# Patient Record
Sex: FEMALE | Race: WHITE | NOT HISPANIC OR LATINO | Employment: STUDENT | URBAN - NONMETROPOLITAN AREA
[De-identification: names, ages, dates, MRNs, and addresses within clinical notes are randomized per-mention and may not be internally consistent; named-entity substitution may affect disease eponyms.]

---

## 2018-02-22 ENCOUNTER — OFFICE VISIT (OUTPATIENT)
Dept: INTERNAL MEDICINE | Facility: CLINIC | Age: 23
End: 2018-02-22

## 2018-02-22 VITALS
BODY MASS INDEX: 24.16 KG/M2 | TEMPERATURE: 98.4 F | OXYGEN SATURATION: 96 % | HEIGHT: 64 IN | DIASTOLIC BLOOD PRESSURE: 64 MMHG | WEIGHT: 141.5 LBS | SYSTOLIC BLOOD PRESSURE: 98 MMHG | RESPIRATION RATE: 14 BRPM | HEART RATE: 58 BPM

## 2018-02-22 DIAGNOSIS — IMO0002: ICD-10-CM

## 2018-02-22 DIAGNOSIS — F41.9 ANXIETY AND DEPRESSION: ICD-10-CM

## 2018-02-22 DIAGNOSIS — F32.A ANXIETY AND DEPRESSION: ICD-10-CM

## 2018-02-22 DIAGNOSIS — Z76.89 ENCOUNTER TO ESTABLISH CARE: Primary | ICD-10-CM

## 2018-02-22 DIAGNOSIS — IMO0002 SELF-INFLICTED INJURY: ICD-10-CM

## 2018-02-22 PROCEDURE — 99204 OFFICE O/P NEW MOD 45 MIN: CPT | Performed by: NURSE PRACTITIONER

## 2018-02-22 RX ORDER — ESCITALOPRAM OXALATE 10 MG/1
10 TABLET ORAL DAILY
Qty: 30 TABLET | Refills: 1 | Status: SHIPPED | OUTPATIENT
Start: 2018-02-22 | End: 2018-04-30

## 2018-02-22 RX ORDER — BUSPIRONE HYDROCHLORIDE 5 MG/1
5 TABLET ORAL 3 TIMES DAILY PRN
Qty: 60 TABLET | Refills: 1 | Status: SHIPPED | OUTPATIENT
Start: 2018-02-22 | End: 2018-04-30 | Stop reason: SDUPTHER
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
## 2018-04-30 ENCOUNTER — OFFICE VISIT (OUTPATIENT)
Dept: INTERNAL MEDICINE | Facility: CLINIC | Age: 23
End: 2018-04-30

## 2018-04-30 VITALS
RESPIRATION RATE: 14 BRPM | OXYGEN SATURATION: 98 % | TEMPERATURE: 98.6 F | HEIGHT: 64 IN | SYSTOLIC BLOOD PRESSURE: 104 MMHG | HEART RATE: 60 BPM | WEIGHT: 141.13 LBS | DIASTOLIC BLOOD PRESSURE: 64 MMHG | BODY MASS INDEX: 24.1 KG/M2

## 2018-04-30 DIAGNOSIS — F32.A ANXIETY AND DEPRESSION: ICD-10-CM

## 2018-04-30 DIAGNOSIS — F41.9 ANXIETY AND DEPRESSION: ICD-10-CM

## 2018-04-30 PROCEDURE — 99213 OFFICE O/P EST LOW 20 MIN: CPT | Performed by: NURSE PRACTITIONER

## 2018-04-30 RX ORDER — ESCITALOPRAM OXALATE 20 MG/1
20 TABLET ORAL DAILY
Qty: 30 TABLET | Refills: 1 | Status: SHIPPED | OUTPATIENT
Start: 2018-04-30 | End: 2018-05-30

## 2018-04-30 RX ORDER — ESCITALOPRAM OXALATE 10 MG/1
TABLET ORAL
Qty: 30 TABLET | Refills: 1 | OUTPATIENT
Start: 2018-04-30

## 2018-04-30 RX ORDER — BUSPIRONE HYDROCHLORIDE 5 MG/1
5 TABLET ORAL 3 TIMES DAILY PRN
Qty: 60 TABLET | Refills: 1 | Status: SHIPPED | OUTPATIENT
Start: 2018-04-30 | End: 2018-05-30 | Stop reason: SDUPTHER

## 2018-05-30 ENCOUNTER — OFFICE VISIT (OUTPATIENT)
Dept: INTERNAL MEDICINE | Facility: CLINIC | Age: 23
End: 2018-05-30

## 2018-05-30 VITALS
OXYGEN SATURATION: 98 % | HEIGHT: 64 IN | HEART RATE: 60 BPM | SYSTOLIC BLOOD PRESSURE: 98 MMHG | RESPIRATION RATE: 12 BRPM | WEIGHT: 147.75 LBS | TEMPERATURE: 98.4 F | BODY MASS INDEX: 25.22 KG/M2 | DIASTOLIC BLOOD PRESSURE: 60 MMHG

## 2018-05-30 DIAGNOSIS — F41.9 ANXIETY AND DEPRESSION: Primary | ICD-10-CM

## 2018-05-30 DIAGNOSIS — J30.2 ACUTE SEASONAL ALLERGIC RHINITIS, UNSPECIFIED TRIGGER: ICD-10-CM

## 2018-05-30 DIAGNOSIS — F32.A ANXIETY AND DEPRESSION: Primary | ICD-10-CM

## 2018-05-30 DIAGNOSIS — B35.1 ONYCHOMYCOSIS OF RIGHT GREAT TOE: ICD-10-CM

## 2018-05-30 DIAGNOSIS — Z30.09 BIRTH CONTROL COUNSELING: ICD-10-CM

## 2018-05-30 PROCEDURE — 99214 OFFICE O/P EST MOD 30 MIN: CPT | Performed by: NURSE PRACTITIONER

## 2018-05-30 RX ORDER — BUSPIRONE HYDROCHLORIDE 5 MG/1
5 TABLET ORAL 3 TIMES DAILY PRN
Qty: 60 TABLET | Refills: 1 | Status: SHIPPED | OUTPATIENT
Start: 2018-05-30 | End: 2019-09-04 | Stop reason: SDDI

## 2018-05-30 RX ORDER — FLUOXETINE 10 MG/1
10 CAPSULE ORAL DAILY
Qty: 30 CAPSULE | Refills: 1 | Status: SHIPPED | OUTPATIENT
Start: 2018-05-30 | End: 2018-06-19 | Stop reason: SINTOL

## 2018-05-30 RX ORDER — FLUTICASONE PROPIONATE 50 MCG
2 SPRAY, SUSPENSION (ML) NASAL DAILY
Qty: 15.8 ML | Refills: 1 | Status: SHIPPED | OUTPATIENT
Start: 2018-05-30 | End: 2018-06-29

## 2018-05-30 NOTE — PROGRESS NOTES
"  Chief Complaint / Reason:      Chief Complaint   Patient presents with   • Anxiety     f/u-1 mo., Lexapro not helpful.    • Depression       Subjective     HPI  Patient presents today for one-month follow-up regarding anxiety and depression.  She indicates that the Lexapro was not very helpful and would like to change it to a different medication.  Denies SI or HI.  She is accompanied by her mother who states that she has tried multiple medications which include Zoloft, Cymbalta, Celexa, Wellbutrin and each of those have not been very effective in controlling her symptoms.  Patient does state that the BuSpar is working but she is much quieter at this visit than her previous.  She does have a list of things she would like to discussed which include nasal spray for allergies and right great toe fungus possibly.  Patient's mother states that her  has a toe fungus in his toenails are very thick and it appears that patient is having the start of some thickening nail beds and she noticed when patient was painting her toenails.  Indicates a small yellow spot but is unable to visualize due to nail polish covering right great toe.  Also would like to discuss birth control.  Patient has tried Nexplanon, NuvaRing and multiple oral agents.  Patient states that she wants something that is not going to make her gain weight and if it does it should be able to come out quickly because otherwise she will get \"mad and flipped over a table\".  Recommend patient following up with Dr. Moe to discuss options available.  Patient was given a handout which discussed IUD devices for her to review.    History taken from: patient    PMH/FH/Social History were reviewed and updated appropriately in the electronic medical record.     Review of Systems:   Review of Systems   Constitutional: Negative.    HENT: Positive for postnasal drip.    Respiratory: Negative.    Cardiovascular: Negative.    Skin: Positive for color change. "   Psychiatric/Behavioral: Positive for dysphoric mood. The patient is nervous/anxious.      All other systems were reviewed and are negative.  Exceptions are noted in the subjective or above.      Objective     Vital Signs  Vitals:    05/30/18 1717   BP: 98/60   Pulse: 60   Resp: 12   Temp: 98.4 °F (36.9 °C)   SpO2: 98%       Body mass index is 25.36 kg/m².    Physical Exam   Constitutional: She is oriented to person, place, and time. She appears well-developed and well-nourished. No distress.   Cardiovascular: Normal rate, regular rhythm and normal heart sounds.    Pulmonary/Chest: Effort normal and breath sounds normal. She has no wheezes. She exhibits no tenderness.   Neurological: She is alert and oriented to person, place, and time.   Skin: Skin is warm and dry. No rash noted. No erythema. No pallor.   Yellowish coloring of nail of right great toe   Psychiatric: She has a normal mood and affect. Her behavior is normal. Judgment and thought content normal.   Nursing note and vitals reviewed.       Results Review:    I reviewed the patient's previous clinical results.       Medication Review:     Current Outpatient Prescriptions:   •  busPIRone (BUSPAR) 5 MG tablet, Take 1 tablet by mouth 3 (Three) Times a Day As Needed (anxiety)., Disp: 60 tablet, Rfl: 1  •  FLUoxetine (PROZAC) 10 MG capsule, Take 1 capsule by mouth Daily., Disp: 30 capsule, Rfl: 1  •  fluticasone (FLONASE) 50 MCG/ACT nasal spray, 2 sprays into each nostril Daily for 30 days. Administer 2 sprays in each nostril for each dose., Disp: 15.8 mL, Rfl: 1    Assessment/Plan   Shikha was seen today for anxiety and depression.    Diagnoses and all orders for this visit:    Anxiety and depression  -     busPIRone (BUSPAR) 5 MG tablet; Take 1 tablet by mouth 3 (Three) Times a Day As Needed (anxiety).  -     FLUoxetine (PROZAC) 10 MG capsule; Take 1 capsule by mouth Daily.  Recommend genesight testing if medication not effective  Onychomycosis of right  great toe  Treatment symptomatically and avoid sharing nail clippers at home  Acute seasonal allergic rhinitis, unspecified trigger  -     fluticasone (FLONASE) 50 MCG/ACT nasal spray; 2 sprays into each nostril Daily for 30 days. Administer 2 sprays in each nostril for each dose.    Birth control counseling  Recommended patient follow-up with Dr. Moe regarding IUD options and advised patient to prevent pregnancy in the meantime      Return if symptoms worsen or fail to improve.    Kinza Tom, APRN  05/30/2018

## 2018-06-19 ENCOUNTER — OFFICE VISIT (OUTPATIENT)
Dept: INTERNAL MEDICINE | Facility: CLINIC | Age: 23
End: 2018-06-19

## 2018-06-19 VITALS
HEART RATE: 66 BPM | DIASTOLIC BLOOD PRESSURE: 62 MMHG | RESPIRATION RATE: 12 BRPM | TEMPERATURE: 98.1 F | HEIGHT: 64 IN | WEIGHT: 148 LBS | BODY MASS INDEX: 25.27 KG/M2 | SYSTOLIC BLOOD PRESSURE: 100 MMHG | OXYGEN SATURATION: 99 %

## 2018-06-19 DIAGNOSIS — F41.9 ANXIETY: ICD-10-CM

## 2018-06-19 DIAGNOSIS — F33.2 SEVERE EPISODE OF RECURRENT MAJOR DEPRESSIVE DISORDER, WITHOUT PSYCHOTIC FEATURES (HCC): Primary | ICD-10-CM

## 2018-06-19 DIAGNOSIS — Z30.09 BIRTH CONTROL COUNSELING: ICD-10-CM

## 2018-06-19 DIAGNOSIS — G43.809 OTHER MIGRAINE WITHOUT STATUS MIGRAINOSUS, NOT INTRACTABLE: ICD-10-CM

## 2018-06-19 PROCEDURE — 99214 OFFICE O/P EST MOD 30 MIN: CPT | Performed by: FAMILY MEDICINE

## 2018-06-19 RX ORDER — BUPROPION HYDROCHLORIDE 150 MG/1
150 TABLET ORAL EVERY MORNING
Qty: 30 TABLET | Refills: 5 | Status: SHIPPED | OUTPATIENT
Start: 2018-06-19 | End: 2019-01-30

## 2018-06-19 RX ORDER — RIZATRIPTAN BENZOATE 5 MG/1
5 TABLET, ORALLY DISINTEGRATING ORAL ONCE AS NEEDED
Qty: 9 TABLET | Refills: 2 | Status: SHIPPED | OUTPATIENT
Start: 2018-06-19 | End: 2018-08-14

## 2018-06-19 NOTE — PROGRESS NOTES
Subjective    Shikha Nunez is a 22 y.o. female here for:  Chief Complaint   Patient presents with   • Establish Care     discuss birth control   • Depression     Desires contraception. Did not like pills, weight gain. Weight gain with Nexplanon and depo provera. Interested in IUD.    Taking Buspar for anxiety. Helps some but is a bit sedating.      Depression   Visit Type: initial  Onset of symptoms: more than 1 year ago  Progression since onset: rapidly worsening  Patient presents with the following symptoms: depressed mood, excessive worry, fatigue, feelings of hopelessness, panic, suicidal ideas and thoughts of death.  Patient is not experiencing: suicidal planning.  Frequency of symptoms: constantly   Severity: interfering with daily activities   Aggravated by: family issues and social activities  Risk factors: family history, major life event and previous episode of depression  Patient has a history of: depression  No history of: arrhythmia, bipolar disorder, CAD, CHF, chronic lung disease, hyperthyroidism and suicide attempt  Treatment tried: SSRI, non-benzodiazephine anxiolytics, non-SSRI antidepressants, lifestyle changes and medications  Compliance with treatment: variable  Improvement on treatment: no relief  Past compliance problems: medication issues      Migraine    This is a chronic problem. The current episode started more than 1 year ago. The problem occurs intermittently. The problem has been unchanged. Associated symptoms include photophobia. She has tried acetaminophen and NSAIDs for the symptoms. The treatment provided mild relief. Her past medical history is significant for migraines in the family. There is no history of cancer, hypertension or immunosuppression.          The following portions of the patient's history were reviewed and updated as appropriate: allergies, current medications, past family history, past medical history, past social history, past surgical history and problem  "list.    Review of Systems   Constitutional: Positive for fatigue.   Eyes: Positive for photophobia.   Genitourinary: Negative for menstrual problem.   Neurological: Positive for headache.   Psychiatric/Behavioral: Positive for suicidal ideas and depressed mood.       Vitals:    06/19/18 1430   BP: 100/62   Pulse: 66   Resp: 12   Temp: 98.1 °F (36.7 °C)   SpO2: 99%   Weight: 67.1 kg (148 lb)   Height: 162.6 cm (64\")         Objective   Physical Exam   Constitutional: She is oriented to person, place, and time. Vital signs are normal. She appears well-developed and well-nourished. She is active. She does not appear ill. No distress.   HENT:   Head: Normocephalic and atraumatic.   Right Ear: Hearing normal.   Left Ear: Hearing normal.   Mouth/Throat: Mucous membranes are not dry.   Eyes: No scleral icterus. Right eye exhibits normal extraocular motion and no nystagmus. Left eye exhibits abnormal extraocular motion. Right pupil is round and reactive.   Artificial left eye   Neck: Neck supple.   Pulmonary/Chest: Effort normal.   Neurological: She is alert and oriented to person, place, and time. She displays no tremor. No cranial nerve deficit. Gait normal.   Skin: Skin is warm. Turgor is normal. No rash noted. She is not diaphoretic.   Psychiatric: Her behavior is normal. Judgment and thought content normal. Her speech is delayed. Cognition and memory are normal. She exhibits a depressed mood. She is attentive.   Nursing note and vitals reviewed.        Assessment/Plan     Problem List Items Addressed This Visit        Other    Severe episode of recurrent major depressive disorder, without psychotic features - Primary    Overview     · Failure of Zoloft, Lexapro, Cymbalta, Prozac          Relevant Medications    buPROPion XL (WELLBUTRIN XL) 150 MG 24 hr tablet    Anxiety      Other Visit Diagnoses     Other migraine without status migrainosus, not intractable        Relevant Medications    buPROPion XL (WELLBUTRIN XL) " 150 MG 24 hr tablet    rizatriptan MLT (MAXALT-MLT) 5 MG disintegrating tablet    Birth control counseling              · Forms signed for IUD. Discussed Jean-Paulena  · Will see back in two weeks. Encouraged patient to stop SSRI if develops any suicidal, homicidal thoughts.  · May continue Buspar as needed.  · Consider counselling    Return in about 2 weeks (around 7/3/2018).    Pia Moe MD    Please note that portions of this note may have been completed with a voice recognition program. Efforts were made to edit dictation, but occasionally words are mistranscribed.

## 2018-07-19 ENCOUNTER — TELEPHONE (OUTPATIENT)
Dept: INTERNAL MEDICINE | Facility: CLINIC | Age: 23
End: 2018-07-19

## 2018-07-19 NOTE — TELEPHONE ENCOUNTER
PATIENT'S MOTHER ADVISES NO PA IS NEEDED FOR BIRTH CONTROL.  INSURANCE COMPANY STATES THEY HAVE NOT RECEIVED ANY REQUEST BUT SHE DOESN'T NEED PRIOR PA.    MOTHER IS VERY UPSET ABOUT THIS AND ASKS THAT YOU PLEASE RETURN CALL ASAP.

## 2018-07-19 NOTE — TELEPHONE ENCOUNTER
I spoke with Vicky at [facility] regarding the patients IUD. She informed me that they needed information from the patients Prescription Card that Included that RXBIN, RXPCN, and RXGRP numbers. We did not have a copy of the patients card on file, so I was unable to give vicky that Information. She also informed me that they have been trying to contact the patient to get this information as well, but have not been successful.      I attempted to contact the patients mother to explain this to her but there was no answer and I could not leave a message.    So, I called the patient, there was no answer, so I did leave a message for the patient explaining what was going on, and requested that she please contact the company so they could get this set to our office asap.

## 2018-08-14 ENCOUNTER — PROCEDURE VISIT (OUTPATIENT)
Dept: INTERNAL MEDICINE | Facility: CLINIC | Age: 23
End: 2018-08-14

## 2018-08-14 VITALS
BODY MASS INDEX: 25.82 KG/M2 | DIASTOLIC BLOOD PRESSURE: 80 MMHG | WEIGHT: 151.25 LBS | HEART RATE: 61 BPM | HEIGHT: 64 IN | OXYGEN SATURATION: 95 % | TEMPERATURE: 98.6 F | SYSTOLIC BLOOD PRESSURE: 115 MMHG

## 2018-08-14 DIAGNOSIS — Z12.4 PAP SMEAR FOR CERVICAL CANCER SCREENING: ICD-10-CM

## 2018-08-14 DIAGNOSIS — Z30.430 ENCOUNTER FOR IUD INSERTION: ICD-10-CM

## 2018-08-14 DIAGNOSIS — G43.809 OTHER MIGRAINE WITHOUT STATUS MIGRAINOSUS, NOT INTRACTABLE: Primary | ICD-10-CM

## 2018-08-14 DIAGNOSIS — Z11.3 SCREEN FOR STD (SEXUALLY TRANSMITTED DISEASE): ICD-10-CM

## 2018-08-14 LAB
B-HCG UR QL: NEGATIVE
INTERNAL NEGATIVE CONTROL: NEGATIVE
INTERNAL POSITIVE CONTROL: POSITIVE
Lab: NORMAL

## 2018-08-14 PROCEDURE — 81025 URINE PREGNANCY TEST: CPT | Performed by: FAMILY MEDICINE

## 2018-08-14 PROCEDURE — 99213 OFFICE O/P EST LOW 20 MIN: CPT | Performed by: FAMILY MEDICINE

## 2018-08-14 RX ORDER — NARATRIPTAN 2.5 MG/1
2.5 TABLET ORAL ONCE AS NEEDED
Qty: 9 TABLET | Refills: 3 | Status: SHIPPED | OUTPATIENT
Start: 2018-08-14 | End: 2018-11-01

## 2018-08-14 RX ORDER — LEVONORGESTREL 19.5 MG/1
INTRAUTERINE DEVICE INTRAUTERINE
Refills: 0 | COMMUNITY
Start: 2018-08-08 | End: 2018-11-01

## 2018-08-14 NOTE — PROGRESS NOTES
Procedure   Insert Drug Implant  Date/Time: 2018 3:40 PM  Performed by: LUCIO PEREZ  Authorized by: LUCIO PEREZ   Consent: Verbal consent obtained. Written consent obtained.  Risks and benefits: risks, benefits and alternatives were discussed  Consent given by: patient  Patient understanding: patient states understanding of the procedure being performed  Patient consent: the patient's understanding of the procedure matches consent given  Procedure consent: procedure consent matches procedure scheduled  Relevant documents: relevant documents present and verified  Test results: test results available and properly labeled  Imaging studies: imaging studies not available  Required items: required blood products, implants, devices, and special equipment available  Patient identity confirmed: verbally with patient  Local anesthesia used: no    Anesthesia:  Local anesthesia used: no    Sedation:  Patient sedated: no  Comments: Cervix cleaned x 3 with iodine. With sterile gloves sound placed in external cervical os, would not pass past 4 cm. Tenaculum placed and this did not help with sounding uterus, sound would still not pass. An os finder, disposable, was used and this also failed to pass through the internal os. Procedure aborted. Kyleena still in box, still wrapped in cellophane. Mom will call if referral needed for gyn. Estimated blood loss minimal. Pap obtained and gonorrhea/chlamydia testing prior to IUD insertion procedure.           Chief Complaint   Patient presents with   • Contraception     Kyleena Insertion   • Migraine     Would like to discuss the Maxalt     Patient here for IUD insertion. [id number]. Kyleena approved. On period, end of cycle, tampon currently in place. Has not had pap smear yet, 21 yo, sexually active.     Also struggling with her migraines, Maxalt and Imitrex have failed to help with  of migraines.     ROS: positive for anxiety, stress, migraines.    Vitals:     08/14/18 1554   BP: 115/80   Pulse: 61   Temp: 98.6 °F (37 °C)   SpO2: 95%   Physical Exam   Constitutional: Vital signs are normal. She appears well-developed and well-nourished. She is active. She does not appear ill. No distress.   Pulmonary/Chest: Effort normal.   Genitourinary: Vagina normal and uterus normal. Pelvic exam was performed with patient prone. No labial fusion. There is no rash, tenderness, lesion or injury on the right labia. There is no rash, tenderness, lesion or injury on the left labia. Cervix exhibits no motion tenderness, no discharge and no friability.   Genitourinary Comments: Melody Cannon MA, Chaperone   Neurological: She is alert.   Skin: Skin is warm.        Psychiatric: Her mood appears anxious.   Nursing note and vitals reviewed.     Procedure visit on 08/14/2018   Component Date Value Ref Range Status   • HCG, Urine, QL 08/14/2018 Negative  Negative Final   • Lot Number 08/14/2018 [id number]   Final   • Internal Positive Control 08/14/2018 Positive   Final   • Internal Negative Control 08/14/2018 Negative   Final     Shikha was seen today for contraception and migraine.    Diagnoses and all orders for this visit:    Other migraine without status migrainosus, not intractable  -     naratriptan (AMERGE) 2.5 MG tablet; Take 1 tablet by mouth 1 (One) Time As Needed for Migraine for up to 1 dose. 2.5 mg at onset of headache, may repeat in 4 hours if needed    Encounter for IUD insertion  -     POC Pregnancy, Urine  -     Insert Drug Implant    Pap smear for cervical cancer screening    Screen for STD (sexually transmitted disease)  -     [facility] - Swab, Vagina    · pap completed, send out.  · Kyleena device and form for [facility] provided to Mom (Missy). Needs gynecology visit for insertion as I'm unable to place device today.  · If referral needed Mom or patient to let me know

## 2018-08-17 LAB
A VAGINAE DNA VAG QL NAA+PROBE: NORMAL SCORE
BVAB2 DNA VAG QL NAA+PROBE: NORMAL SCORE
C ALBICANS DNA VAG QL NAA+PROBE: NEGATIVE
C GLABRATA DNA VAG QL NAA+PROBE: NEGATIVE
C TRACH RRNA SPEC QL NAA+PROBE: NEGATIVE
MEGA1 DNA VAG QL NAA+PROBE: NORMAL SCORE
N GONORRHOEA RRNA SPEC QL NAA+PROBE: NEGATIVE
T VAGINALIS RRNA SPEC QL NAA+PROBE: NEGATIVE

## 2018-11-01 ENCOUNTER — OFFICE VISIT (OUTPATIENT)
Dept: INTERNAL MEDICINE | Facility: CLINIC | Age: 23
End: 2018-11-01

## 2018-11-01 VITALS
BODY MASS INDEX: 27.7 KG/M2 | HEART RATE: 73 BPM | DIASTOLIC BLOOD PRESSURE: 70 MMHG | TEMPERATURE: 98.4 F | SYSTOLIC BLOOD PRESSURE: 115 MMHG | HEIGHT: 64 IN | WEIGHT: 162.25 LBS | OXYGEN SATURATION: 98 %

## 2018-11-01 DIAGNOSIS — G43.809 OTHER MIGRAINE WITHOUT STATUS MIGRAINOSUS, NOT INTRACTABLE: Primary | ICD-10-CM

## 2018-11-01 DIAGNOSIS — Z30.432 ENCOUNTER FOR IUD REMOVAL: ICD-10-CM

## 2018-11-01 PROCEDURE — 99213 OFFICE O/P EST LOW 20 MIN: CPT | Performed by: FAMILY MEDICINE

## 2018-11-01 PROCEDURE — 58301 REMOVE INTRAUTERINE DEVICE: CPT | Performed by: FAMILY MEDICINE

## 2018-11-01 RX ORDER — ETONOGESTREL AND ETHINYL ESTRADIOL 11.7; 2.7 MG/1; MG/1
INSERT, EXTENDED RELEASE VAGINAL
Qty: 3 EACH | Refills: 4 | Status: SHIPPED | OUTPATIENT
Start: 2018-11-01 | End: 2019-02-04

## 2018-11-01 RX ORDER — ALMOTRIPTAN 6.25 MG/1
TABLET, FILM COATED ORAL
Qty: 30 TABLET | Refills: 3 | Status: SHIPPED | OUTPATIENT
Start: 2018-11-01 | End: 2019-09-04

## 2018-11-01 NOTE — PROGRESS NOTES
"Subjective    Shikha Nunez is a 22 y.o. female here for:  Chief Complaint   Patient presents with   • Procedure     IUD Removal     History of Present Illness     Patient has Kyleena IUD and desires removal. Requests Nuvaring which she's tried in the past. Has failed Nexplanon and OCPs. Patient has migraines.    Also needs a new migraine medicine. Current triptan is not reliving headaches. Has tried Maxalt and Imitrex. Amerge listed as current triptan.     The following portions of the patient's history were reviewed and updated as appropriate: allergies, current medications, past family history, past medical history, past social history, past surgical history and problem list.    Review of Systems   Genitourinary: Positive for pelvic pain.   Neurological: Positive for headache.   Psychiatric/Behavioral: Positive for dysphoric mood.       Vitals:    11/01/18 1132   BP: 115/70   Pulse: 73   Temp: 98.4 °F (36.9 °C)   SpO2: 98%   Weight: 73.6 kg (162 lb 4 oz)   Height: 162.6 cm (64.02\")         Objective   Physical Exam   Constitutional: She is oriented to person, place, and time. Vital signs are normal. She appears well-developed and well-nourished. She is active.  Non-toxic appearance. She does not appear ill. No distress. She appears overweight.   HENT:   Head: Normocephalic and atraumatic.   Right Ear: Hearing normal.   Left Ear: Hearing normal.   Mouth/Throat: Mucous membranes are not dry.   Eyes: No scleral icterus.   Neck: Neck supple.   Pulmonary/Chest: Effort normal.   Genitourinary: Vagina normal and cervix normal. Pelvic exam was performed with patient supine. No labial fusion. There is no rash, tenderness, lesion, injury or Bartholin's cyst on the right labia. There is no rash, tenderness, lesion, injury or Bartholin's cyst on the left labia.   Neurological: She is alert and oriented to person, place, and time. No cranial nerve deficit.   Skin: Skin is warm. She is not diaphoretic.   Psychiatric: She has a " normal mood and affect. Her behavior is normal.   Nursing note and vitals reviewed.      Remove & Insert Drug Implant  Date/Time: 11/1/2018 11:45 AM  Performed by: LUCIO MOE  Authorized by: LUCIO MOE   Consent: Verbal consent obtained. Written consent obtained.  Risks and benefits: risks, benefits and alternatives were discussed  Consent given by: patient  Patient understanding: patient states understanding of the procedure being performed  Patient consent: the patient's understanding of the procedure matches consent given  Procedure consent: procedure consent matches procedure scheduled  Relevant documents: relevant documents present and verified  Required items: required blood products, implants, devices, and special equipment available  Patient identity confirmed: verbally with patient  Local anesthesia used: no    Anesthesia:  Local anesthesia used: no    Sedation:  Patient sedated: no  Patient tolerance: Patient tolerated the procedure well with no immediate complications  Comments: Ring forceps implemented to pull IUD from uterus with gentle traction. IUD removed easily. No blood loss appreciated.            Assessment/Plan     Problem List Items Addressed This Visit     None      Visit Diagnoses     Other migraine without status migrainosus, not intractable    -  Primary    Relevant Medications    almotriptan (AXERT) 6.25 MG tablet    Encounter for IUD removal        Relevant Orders    Remove & Insert Drug Implant            Lucio Moe MD

## 2018-11-14 ENCOUNTER — PATIENT MESSAGE (OUTPATIENT)
Dept: INTERNAL MEDICINE | Facility: CLINIC | Age: 23
End: 2018-11-14

## 2018-11-14 NOTE — TELEPHONE ENCOUNTER
From: Shikha Nunez  To: Pia Moe MD  Sent: 11/14/2018 10:49 AM EST  Subject: Prescription Question    Could you please send Allegra 180mg allergy med.? I’ve had sinus headache and congestion for over a week and don’t think previous allergy medicine is helping anymore. If I need to schedule an appt. please let me know.   Thank you,   [facility]

## 2018-11-18 RX ORDER — FEXOFENADINE HCL 180 MG/1
180 TABLET ORAL DAILY
Qty: 90 TABLET | Refills: 3 | Status: SHIPPED | OUTPATIENT
Start: 2018-11-18 | End: 2022-12-13

## 2018-11-18 RX ORDER — FEXOFENADINE HCL 180 MG/1
180 TABLET ORAL DAILY
Qty: 90 TABLET | Refills: 3 | Status: SHIPPED | OUTPATIENT
Start: 2018-11-18 | End: 2018-11-18 | Stop reason: SDUPTHER

## 2019-01-28 ENCOUNTER — PATIENT MESSAGE (OUTPATIENT)
Dept: INTERNAL MEDICINE | Facility: CLINIC | Age: 24
End: 2019-01-28

## 2019-01-28 DIAGNOSIS — F33.2 SEVERE EPISODE OF RECURRENT MAJOR DEPRESSIVE DISORDER, WITHOUT PSYCHOTIC FEATURES (HCC): ICD-10-CM

## 2019-01-30 RX ORDER — BUPROPION HYDROCHLORIDE 300 MG/1
300 TABLET ORAL EVERY MORNING
Qty: 90 TABLET | Refills: 4 | Status: SHIPPED | OUTPATIENT
Start: 2019-01-30 | End: 2019-09-04

## 2019-02-04 ENCOUNTER — OFFICE VISIT (OUTPATIENT)
Dept: INTERNAL MEDICINE | Facility: CLINIC | Age: 24
End: 2019-02-04

## 2019-02-04 VITALS
RESPIRATION RATE: 16 BRPM | HEART RATE: 83 BPM | SYSTOLIC BLOOD PRESSURE: 108 MMHG | BODY MASS INDEX: 27.36 KG/M2 | DIASTOLIC BLOOD PRESSURE: 70 MMHG | OXYGEN SATURATION: 97 % | WEIGHT: 160.25 LBS | TEMPERATURE: 98.1 F | HEIGHT: 64 IN

## 2019-02-04 DIAGNOSIS — F33.2 SEVERE EPISODE OF RECURRENT MAJOR DEPRESSIVE DISORDER, WITHOUT PSYCHOTIC FEATURES (HCC): Primary | ICD-10-CM

## 2019-02-04 DIAGNOSIS — G43.809 OTHER MIGRAINE WITHOUT STATUS MIGRAINOSUS, NOT INTRACTABLE: ICD-10-CM

## 2019-02-04 DIAGNOSIS — F41.9 ANXIETY: ICD-10-CM

## 2019-02-04 DIAGNOSIS — K52.9 GASTROENTERITIS: ICD-10-CM

## 2019-02-04 PROBLEM — G43.909 MIGRAINE: Status: ACTIVE | Noted: 2019-02-04

## 2019-02-04 PROCEDURE — 99214 OFFICE O/P EST MOD 30 MIN: CPT | Performed by: FAMILY MEDICINE

## 2019-02-04 RX ORDER — RIZATRIPTAN BENZOATE 5 MG/1
TABLET, ORALLY DISINTEGRATING ORAL
Refills: 0 | COMMUNITY
Start: 2018-12-14 | End: 2019-02-04

## 2019-02-04 RX ORDER — NARATRIPTAN 2.5 MG/1
TABLET ORAL
Refills: 0 | COMMUNITY
Start: 2018-12-14 | End: 2019-09-04

## 2019-02-04 RX ORDER — ZOLMITRIPTAN 2.5 MG/1
1 SPRAY, METERED NASAL DAILY PRN
Qty: 1 EACH | Refills: 0 | COMMUNITY
Start: 2019-02-04 | End: 2019-09-04

## 2019-02-04 NOTE — PROGRESS NOTES
Subjective    Shikha Nunez is a 23 y.o. female here for:  Chief Complaint   Patient presents with   • Depression     would like to discuss going on a higher dose of wellbutrin    • Anxiety   • Vomiting     vomiting and dizziness over the weekend. patient states she feels a little bit better now but still has a headache    • Headache       History of Present Illness   Patient was sick this week and with likely gastroenteritis.  She suffered nausea and vomiting followed by diarrhea.  This has improved.  She needs an excuse for work    Continues to have migraines regularly.  She has a migraine at least twice a week.  She has tried several triptans they either cause side effects.  NSAIDs do not help her migraines.  Migraines may be worsened by her artificial eye which has some calcium deposits rounded at this time, mom is trying to get her in with her eye doctor.  Her mother also has migraines and has benefited from the new migraine class, she is using Aimovig.    She has not gotten the Wellbutrin 300 mg yet that I sent to the pharmacy, they were still dispensing 150 mg of the Wellbutrin XL.  She was previously on the 300 mg dose and it helped more with her anxiety and depression.  She is in an apartment now and feels her dog would help her greatly, she needs a letter for emotional support animal.    The following portions of the patient's history were reviewed and updated as appropriate: allergies, current medications, past family history, past medical history, past social history, past surgical history and problem list.    Health Maintenance   Topic Date Due   • ANNUAL PHYSICAL  12/20/1999   • HPV VACCINES (1 - Female 3-dose series) 12/20/2007   • INFLUENZA VACCINE  08/01/2018   • PAP SMEAR  08/15/2021   • TDAP/TD VACCINES (2 - Td) 12/08/2027   • MENINGOCOCCAL VACCINE (Normal Risk)  Aged Out       Review of Systems   Constitutional: Positive for fatigue.   Gastrointestinal: Positive for diarrhea, nausea and vomiting.  "  Neurological: Positive for headache.   Psychiatric/Behavioral: Positive for decreased concentration and stress.       Vitals:    02/04/19 0858   BP: 108/70   Pulse: 83   Resp: 16   Temp: 98.1 °F (36.7 °C)   TempSrc: Temporal   SpO2: 97%   Weight: 72.7 kg (160 lb 4 oz)   Height: 162.6 cm (64.02\")         Objective   Physical Exam   Constitutional: She is oriented to person, place, and time. Vital signs are normal. She appears well-developed and well-nourished. She is active. She does not appear ill. No distress.   HENT:   Head: Normocephalic and atraumatic.   Right Ear: Hearing normal.   Left Ear: Hearing normal.   Mouth/Throat: Mucous membranes are not dry.   Eyes: No scleral icterus. Right eye exhibits normal extraocular motion and no nystagmus. Left eye exhibits abnormal extraocular motion. Right pupil is round and reactive.   Artificial left eye   Neck: Neck supple.   Pulmonary/Chest: Effort normal.   Neurological: She is alert and oriented to person, place, and time. She displays no tremor. No cranial nerve deficit. Gait normal.   Skin: Skin is warm. Turgor is normal. No rash noted. She is not diaphoretic.   Psychiatric: Her speech is normal and behavior is normal. Judgment and thought content normal. Her mood appears anxious. Cognition and memory are normal. She does not exhibit a depressed mood. She is attentive.   Nursing note and vitals reviewed.        Assessment/Plan     Problem List Items Addressed This Visit        Cardiovascular and Mediastinum    Migraine    Relevant Medications    naratriptan (AMERGE) 2.5 MG tablet    ZOLMitriptan 2.5 MG solution    galcanezumab-gnlm (EMGALITY) 120 MG/ML prefilled syringe    galcanezumab-gnlm (EMGALITY) 120 MG/ML prefilled syringe       Other    Severe episode of recurrent major depressive disorder, without psychotic features (CMS/HCC) - Primary    Overview     · Failure of Zoloft, Lexapro, Cymbalta, Prozac          Anxiety      Other Visit Diagnoses     " Gastroenteritis        Resolved, note for work.          · Wellbutrin XL since previously for her depression and anxiety, take a prescription  · Trial of Emgality as we have samples of it.  She was also given a sample of Zomig nasal spray for breakthrough migraine.    Return in about 6 weeks (around 3/18/2019) for Follow up on current issues.    Pia Moe MD

## 2019-03-17 ENCOUNTER — HOSPITAL ENCOUNTER (EMERGENCY)
Facility: HOSPITAL | Age: 24
Discharge: HOME | End: 2019-03-17
Attending: EMERGENCY MEDICINE | Admitting: EMERGENCY MEDICINE

## 2019-03-17 ENCOUNTER — APPOINTMENT (OUTPATIENT)
Dept: CT IMAGING | Facility: HOSPITAL | Age: 24
End: 2019-03-17

## 2019-03-17 VITALS
DIASTOLIC BLOOD PRESSURE: 80 MMHG | RESPIRATION RATE: 16 BRPM | SYSTOLIC BLOOD PRESSURE: 112 MMHG | TEMPERATURE: 98.7 F | WEIGHT: 148 LBS | HEIGHT: 63 IN | HEART RATE: 79 BPM | BODY MASS INDEX: 26.22 KG/M2 | OXYGEN SATURATION: 99 %

## 2019-03-17 DIAGNOSIS — H57.12 PAIN OF LEFT EYE: ICD-10-CM

## 2019-03-17 DIAGNOSIS — J06.9 UPPER RESPIRATORY TRACT INFECTION, UNSPECIFIED TYPE: Primary | ICD-10-CM

## 2019-03-17 LAB
ALBUMIN SERPL-MCNC: 4.68 G/DL (ref 3.2–4.8)
ALBUMIN/GLOB SERPL: 1.8 G/DL (ref 1.5–2.5)
ALP SERPL-CCNC: 105 U/L (ref 25–100)
ALT SERPL W P-5'-P-CCNC: 8 U/L (ref 7–40)
ANION GAP SERPL CALCULATED.3IONS-SCNC: 8 MMOL/L (ref 3–11)
AST SERPL-CCNC: 14 U/L (ref 0–33)
BASOPHILS # BLD AUTO: 0.04 10*3/MM3 (ref 0–0.2)
BASOPHILS NFR BLD AUTO: 0.6 % (ref 0–1)
BILIRUB SERPL-MCNC: 0.3 MG/DL (ref 0.3–1.2)
BUN BLD-MCNC: 6 MG/DL (ref 9–23)
BUN/CREAT SERPL: 8.3 (ref 7–25)
CALCIUM SPEC-SCNC: 9.8 MG/DL (ref 8.7–10.4)
CHLORIDE SERPL-SCNC: 107 MMOL/L (ref 99–109)
CO2 SERPL-SCNC: 26 MMOL/L (ref 20–31)
CREAT BLD-MCNC: 0.72 MG/DL (ref 0.6–1.3)
DEPRECATED RDW RBC AUTO: 37.9 FL (ref 37–54)
EOSINOPHIL # BLD AUTO: 0.11 10*3/MM3 (ref 0–0.3)
EOSINOPHIL NFR BLD AUTO: 1.8 % (ref 0–3)
ERYTHROCYTE [DISTWIDTH] IN BLOOD BY AUTOMATED COUNT: 12.2 % (ref 11.3–14.5)
GFR SERPL CREATININE-BSD FRML MDRD: 101 ML/MIN/1.73
GLOBULIN UR ELPH-MCNC: 2.6 GM/DL
GLUCOSE BLD-MCNC: 86 MG/DL (ref 70–100)
HCT VFR BLD AUTO: 42.7 % (ref 34.5–44)
HGB BLD-MCNC: 14.7 G/DL (ref 11.5–15.5)
IMM GRANULOCYTES # BLD AUTO: 0.02 10*3/MM3 (ref 0–0.05)
IMM GRANULOCYTES NFR BLD AUTO: 0.3 % (ref 0–0.6)
LYMPHOCYTES # BLD AUTO: 1.65 10*3/MM3 (ref 0.6–4.8)
LYMPHOCYTES NFR BLD AUTO: 26.4 % (ref 24–44)
MCH RBC QN AUTO: 29.5 PG (ref 27–31)
MCHC RBC AUTO-ENTMCNC: 34.4 G/DL (ref 32–36)
MCV RBC AUTO: 85.6 FL (ref 80–99)
MONOCYTES # BLD AUTO: 0.52 10*3/MM3 (ref 0–1)
MONOCYTES NFR BLD AUTO: 8.3 % (ref 0–12)
NEUTROPHILS # BLD AUTO: 3.93 10*3/MM3 (ref 1.5–8.3)
NEUTROPHILS NFR BLD AUTO: 62.9 % (ref 41–71)
PLATELET # BLD AUTO: 283 10*3/MM3 (ref 150–450)
PMV BLD AUTO: 10.9 FL (ref 6–12)
POTASSIUM BLD-SCNC: 3.6 MMOL/L (ref 3.5–5.5)
PROT SERPL-MCNC: 7.3 G/DL (ref 5.7–8.2)
RBC # BLD AUTO: 4.99 10*6/MM3 (ref 3.89–5.14)
SODIUM BLD-SCNC: 141 MMOL/L (ref 132–146)
WBC NRBC COR # BLD: 6.25 10*3/MM3 (ref 3.5–10.8)

## 2019-03-17 PROCEDURE — 99283 EMERGENCY DEPT VISIT LOW MDM: CPT

## 2019-03-17 PROCEDURE — 70481 CT ORBIT/EAR/FOSSA W/DYE: CPT

## 2019-03-17 PROCEDURE — 0 IOPAMIDOL PER 1 ML: Performed by: EMERGENCY MEDICINE

## 2019-03-17 PROCEDURE — 87040 BLOOD CULTURE FOR BACTERIA: CPT | Performed by: EMERGENCY MEDICINE

## 2019-03-17 PROCEDURE — 80053 COMPREHEN METABOLIC PANEL: CPT | Performed by: EMERGENCY MEDICINE

## 2019-03-17 PROCEDURE — 85025 COMPLETE CBC W/AUTO DIFF WBC: CPT | Performed by: EMERGENCY MEDICINE

## 2019-03-17 RX ORDER — AMOXICILLIN AND CLAVULANATE POTASSIUM 875; 125 MG/1; MG/1
1 TABLET, FILM COATED ORAL EVERY 12 HOURS
Qty: 20 TABLET | Refills: 0 | Status: SHIPPED | OUTPATIENT
Start: 2019-03-17 | End: 2019-03-27

## 2019-03-17 RX ORDER — SODIUM CHLORIDE 0.9 % (FLUSH) 0.9 %
10 SYRINGE (ML) INJECTION AS NEEDED
Status: DISCONTINUED | OUTPATIENT
Start: 2019-03-17 | End: 2019-03-17 | Stop reason: HOSPADM

## 2019-03-17 RX ADMIN — IOPAMIDOL 50 ML: 755 INJECTION, SOLUTION INTRAVENOUS at 18:30

## 2019-03-17 NOTE — ED PROVIDER NOTES
Subjective   Shikha Nunez is a 23 y.o. female who presents to the emergency department with complaints of drainage from her left eye that started today. 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 The patient reports that she has nasal congestion and is currently taking Sudafed. She denies any fever, itching, tearing, and any other acute symptoms at this time.         History provided by:  Patient  Eye Problem   Location:  Left eye  Quality: drainage.  Severity:  Moderate  Onset quality:  Sudden  Duration:  1 day  Timing:  Constant  Progression:  Unchanged  Chronicity:  New  Relieved by:  Nothing  Worsened by:  Nothing  Ineffective treatments:  None tried  Associated symptoms: discharge    Associated symptoms: no itching and no tearing        Review of Systems   Eyes: Positive for discharge. Negative for itching.        Negative for tearing.    All other systems reviewed and are negative.      Past Medical History:   Diagnosis Date   • Anxiety    • Blind left eye    • Depression    • Migraine    • Presence of artificial eye     left,since childhood       Allergies   Allergen Reactions   • Adhesive Tape Rash   • Sulfa Antibiotics Rash       Past Surgical History:   Procedure Laterality Date   • EYE SURGERY Left     multiple, prosthetic eye       Family History   Problem Relation Age of Onset   • Migraines Mother    • Arthritis Father    • Migraines Father    • Arthritis Brother    • Migraines Brother        Social History     Socioeconomic History   • Marital status: Single     Spouse name: Not on file   • Number of children: Not on file   • Years of education: Not on file   • Highest education level: Not on file   Tobacco Use   • Smoking status: Never Smoker   • Smokeless tobacco: Never Used   Substance and Sexual Activity   • Alcohol use: No   • Drug use: No   • Sexual activity: Defer         Objective   Physical Exam   Constitutional: She is oriented to person, place, and time.  She appears well-developed and well-nourished. No distress.   HENT:   Head: Normocephalic and atraumatic.   The patient has nasal congestion. There is no facial swelling or redness otherwise.    Eyes:   The patients lid is intact and normal then removed and in what would be the retrobulbar fossa there was erythema and a moderate amount of discharge. There was no periorbital redness.    Neck: Normal range of motion. Neck supple.   Cardiovascular: Normal rate, regular rhythm and normal heart sounds. Exam reveals no friction rub.   Pulmonary/Chest: Effort normal and breath sounds normal. No respiratory distress. She has no wheezes. She has no rales.   Abdominal: Soft. There is no tenderness.   Musculoskeletal: Normal range of motion.   Neurological: She is alert and oriented to person, place, and time.   Skin: Skin is warm and dry. She is not diaphoretic.   Psychiatric: She has a normal mood and affect. Her behavior is normal.   Nursing note and vitals reviewed.      Procedures         ED Course  ED Course as of Mar 17 2015   Sun Mar 17, 2019   8485 Favian Kulkarni is concerned about a retrobulbar infection because of pain and will be obtaining a CT scan because of that. -JI    [CN]      ED Course User Index  [CN] Lisa Cordero     Recent Results (from the past 24 hour(s))   Comprehensive Metabolic Panel    Collection Time: 03/17/19  5:30 PM   Result Value Ref Range    Glucose 86 70 - 100 mg/dL    BUN 6 (L) 9 - 23 mg/dL    Creatinine 0.72 0.60 - 1.30 mg/dL    Sodium 141 132 - 146 mmol/L    Potassium 3.6 3.5 - 5.5 mmol/L    Chloride 107 99 - 109 mmol/L    CO2 26.0 20.0 - 31.0 mmol/L    Calcium 9.8 8.7 - 10.4 mg/dL    Total Protein 7.3 5.7 - 8.2 g/dL    Albumin 4.68 3.20 - 4.80 g/dL    ALT (SGPT) 8 7 - 40 U/L    AST (SGOT) 14 0 - 33 U/L    Alkaline Phosphatase 105 (H) 25 - 100 U/L    Total Bilirubin 0.3 0.3 - 1.2 mg/dL    eGFR Non African Amer 101 >60 mL/min/1.73    Globulin 2.6 gm/dL    A/G Ratio 1.8 1.5 - 2.5 g/dL     "BUN/Creatinine Ratio 8.3 7.0 - 25.0    Anion Gap 8.0 3.0 - 11.0 mmol/L   CBC Auto Differential    Collection Time: 03/17/19  5:30 PM   Result Value Ref Range    WBC 6.25 3.50 - 10.80 10*3/mm3    RBC 4.99 3.89 - 5.14 10*6/mm3    Hemoglobin 14.7 11.5 - 15.5 g/dL    Hematocrit 42.7 34.5 - 44.0 %    MCV 85.6 80.0 - 99.0 fL    MCH 29.5 27.0 - 31.0 pg    MCHC 34.4 32.0 - 36.0 g/dL    RDW 12.2 11.3 - 14.5 %    RDW-SD 37.9 37.0 - 54.0 fl    MPV 10.9 6.0 - 12.0 fL    Platelets 283 150 - 450 10*3/mm3    Neutrophil % 62.9 41.0 - 71.0 %    Lymphocyte % 26.4 24.0 - 44.0 %    Monocyte % 8.3 0.0 - 12.0 %    Eosinophil % 1.8 0.0 - 3.0 %    Basophil % 0.6 0.0 - 1.0 %    Immature Grans % 0.3 0.0 - 0.6 %    Neutrophils, Absolute 3.93 1.50 - 8.30 10*3/mm3    Lymphocytes, Absolute 1.65 0.60 - 4.80 10*3/mm3    Monocytes, Absolute 0.52 0.00 - 1.00 10*3/mm3    Eosinophils, Absolute 0.11 0.00 - 0.30 10*3/mm3    Basophils, Absolute 0.04 0.00 - 0.20 10*3/mm3    Immature Grans, Absolute 0.02 0.00 - 0.05 10*3/mm3     Note: In addition to lab results from this visit, the labs listed above may include labs taken at another facility or during a different encounter within the last 24 hours. Please correlate lab times with ED admission and discharge times for further clarification of the services performed during this visit.    CT Orbits With Contrast   Preliminary Result   1. Left orbital prosthesis.    2. No evidence of inflammatory process in the contralateral right orbit   or other clearly acute disease.       DICTATED:   3/17/2019   EDITED/ls :   3/17/2019             Vitals:    03/17/19 1617   BP: 120/76   BP Location: Left arm   Patient Position: Sitting   Pulse: 83   Resp: 14   Temp: 98.7 °F (37.1 °C)   TempSrc: Oral   SpO2: 95%   Weight: 67.1 kg (148 lb)   Height: 160 cm (63\")     Medications   sodium chloride 0.9 % flush 10 mL (not administered)   iopamidol (ISOVUE-370) 76 % injection 50 mL (50 mL Intravenous Given 3/17/19 9700) "     ECG/EMG Results (last 24 hours)     ** No results found for the last 24 hours. **        No orders to display                 Recent Results (from the past 24 hour(s))   Comprehensive Metabolic Panel    Collection Time: 03/17/19  5:30 PM   Result Value Ref Range    Glucose 86 70 - 100 mg/dL    BUN 6 (L) 9 - 23 mg/dL    Creatinine 0.72 0.60 - 1.30 mg/dL    Sodium 141 132 - 146 mmol/L    Potassium 3.6 3.5 - 5.5 mmol/L    Chloride 107 99 - 109 mmol/L    CO2 26.0 20.0 - 31.0 mmol/L    Calcium 9.8 8.7 - 10.4 mg/dL    Total Protein 7.3 5.7 - 8.2 g/dL    Albumin 4.68 3.20 - 4.80 g/dL    ALT (SGPT) 8 7 - 40 U/L    AST (SGOT) 14 0 - 33 U/L    Alkaline Phosphatase 105 (H) 25 - 100 U/L    Total Bilirubin 0.3 0.3 - 1.2 mg/dL    eGFR Non African Amer 101 >60 mL/min/1.73    Globulin 2.6 gm/dL    A/G Ratio 1.8 1.5 - 2.5 g/dL    BUN/Creatinine Ratio 8.3 7.0 - 25.0    Anion Gap 8.0 3.0 - 11.0 mmol/L   CBC Auto Differential    Collection Time: 03/17/19  5:30 PM   Result Value Ref Range    WBC 6.25 3.50 - 10.80 10*3/mm3    RBC 4.99 3.89 - 5.14 10*6/mm3    Hemoglobin 14.7 11.5 - 15.5 g/dL    Hematocrit 42.7 34.5 - 44.0 %    MCV 85.6 80.0 - 99.0 fL    MCH 29.5 27.0 - 31.0 pg    MCHC 34.4 32.0 - 36.0 g/dL    RDW 12.2 11.3 - 14.5 %    RDW-SD 37.9 37.0 - 54.0 fl    MPV 10.9 6.0 - 12.0 fL    Platelets 283 150 - 450 10*3/mm3    Neutrophil % 62.9 41.0 - 71.0 %    Lymphocyte % 26.4 24.0 - 44.0 %    Monocyte % 8.3 0.0 - 12.0 %    Eosinophil % 1.8 0.0 - 3.0 %    Basophil % 0.6 0.0 - 1.0 %    Immature Grans % 0.3 0.0 - 0.6 %    Neutrophils, Absolute 3.93 1.50 - 8.30 10*3/mm3    Lymphocytes, Absolute 1.65 0.60 - 4.80 10*3/mm3    Monocytes, Absolute 0.52 0.00 - 1.00 10*3/mm3    Eosinophils, Absolute 0.11 0.00 - 0.30 10*3/mm3    Basophils, Absolute 0.04 0.00 - 0.20 10*3/mm3    Immature Grans, Absolute 0.02 0.00 - 0.05 10*3/mm3     Note: In addition to lab results from this visit, the labs listed above may include labs taken at another facility  "or during a different encounter within the last 24 hours. Please correlate lab times with ED admission and discharge times for further clarification of the services performed during this visit.    CT Orbits With Contrast   Preliminary Result   1. Left orbital prosthesis.    2. No evidence of inflammatory process in the contralateral right orbit   or other clearly acute disease.       DICTATED:   3/17/2019   EDITED/ls :   3/17/2019             Vitals:    03/17/19 1617   BP: 120/76   BP Location: Left arm   Patient Position: Sitting   Pulse: 83   Resp: 14   Temp: 98.7 °F (37.1 °C)   TempSrc: Oral   SpO2: 95%   Weight: 67.1 kg (148 lb)   Height: 160 cm (63\")     Medications   sodium chloride 0.9 % flush 10 mL (not administered)   iopamidol (ISOVUE-370) 76 % injection 50 mL (50 mL Intravenous Given 3/17/19 1830)     ECG/EMG Results (last 24 hours)     ** No results found for the last 24 hours. **        No orders to display             MDM    Final diagnoses:   Upper respiratory tract infection, unspecified type   Pain of left eye       Documentation assistance provided by manolo Cordero.  Information recorded by the scribe was done at my direction and has been verified and validated by me.     Lisa Cordero  03/17/19 2918       Favian Kulkarni PA  03/17/19 2015    "

## 2019-03-22 LAB
BACTERIA SPEC AEROBE CULT: NORMAL
BACTERIA SPEC AEROBE CULT: NORMAL

## 2019-09-04 ENCOUNTER — PATIENT MESSAGE (OUTPATIENT)
Dept: INTERNAL MEDICINE | Facility: CLINIC | Age: 24
End: 2019-09-04

## 2019-09-04 ENCOUNTER — OFFICE VISIT (OUTPATIENT)
Dept: INTERNAL MEDICINE | Facility: CLINIC | Age: 24
End: 2019-09-04

## 2019-09-04 VITALS
TEMPERATURE: 97 F | RESPIRATION RATE: 16 BRPM | DIASTOLIC BLOOD PRESSURE: 64 MMHG | HEIGHT: 63 IN | BODY MASS INDEX: 25.34 KG/M2 | SYSTOLIC BLOOD PRESSURE: 102 MMHG | WEIGHT: 143 LBS

## 2019-09-04 DIAGNOSIS — F33.2 SEVERE EPISODE OF RECURRENT MAJOR DEPRESSIVE DISORDER, WITHOUT PSYCHOTIC FEATURES (HCC): Primary | ICD-10-CM

## 2019-09-04 DIAGNOSIS — Z23 NEED FOR INFLUENZA VACCINATION: ICD-10-CM

## 2019-09-04 DIAGNOSIS — G43.809 OTHER MIGRAINE WITHOUT STATUS MIGRAINOSUS, NOT INTRACTABLE: ICD-10-CM

## 2019-09-04 DIAGNOSIS — Z97.0 PRESENCE OF ARTIFICIAL EYE: ICD-10-CM

## 2019-09-04 PROCEDURE — 90674 CCIIV4 VAC NO PRSV 0.5 ML IM: CPT | Performed by: FAMILY MEDICINE

## 2019-09-04 PROCEDURE — 99214 OFFICE O/P EST MOD 30 MIN: CPT | Performed by: FAMILY MEDICINE

## 2019-09-04 PROCEDURE — 90471 IMMUNIZATION ADMIN: CPT | Performed by: FAMILY MEDICINE

## 2019-09-04 RX ORDER — BUPROPION HYDROCHLORIDE 150 MG/1
150 TABLET ORAL EVERY MORNING
Qty: 7 TABLET | Refills: 0 | Status: SHIPPED | OUTPATIENT
Start: 2019-09-04 | End: 2019-10-25

## 2019-09-04 RX ORDER — VILAZODONE HYDROCHLORIDE 20 MG/1
20 TABLET ORAL DAILY
Qty: 90 TABLET | Refills: 3 | Status: SHIPPED | OUTPATIENT
Start: 2019-09-04 | End: 2019-10-25

## 2019-09-04 RX ORDER — ONDANSETRON 8 MG/1
8 TABLET, ORALLY DISINTEGRATING ORAL EVERY 8 HOURS PRN
Qty: 15 TABLET | Refills: 0 | Status: SHIPPED | OUTPATIENT
Start: 2019-09-04 | End: 2020-01-24

## 2019-09-04 NOTE — TELEPHONE ENCOUNTER
From: Shikha Nunez  To: Pia Moe MD  Sent: 9/4/2019 4:28 PM EDT  Subject: Prescription Question    Could you please send zofran to the [facility] in [place] if [facility] closed for today. The new med has caused her to vomit. She may just have to take the zofran beforehand until body adjusts. Thank you luis Lackey/Missy

## 2019-09-06 PROBLEM — Z97.0 PRESENCE OF ARTIFICIAL EYE: Status: ACTIVE | Noted: 2019-09-06

## 2019-09-07 NOTE — PROGRESS NOTES
"Subjective    Shikha Nunez is a 23 y.o. female here for:    Chief Complaint   Patient presents with   • Anxiety     would like to discuss new anxiety medications        Mom has brought in patient due to severe anxiety and depression. Patient does not want to complete the PHQ-9 as she says she's filled it out time after time and it never helps, no medicines have helped. Admits to thoughts of hurting self but does not admit to plan. Wellbutrin is not helping.     Migraines; chronic, severe at times. Occur at least once a week. Was given sample of migraine medicine in the past but does not think she tried it.    Artificial eye, needs antibiotic ointment for when she cleans it.           The following portions of the patient's history were reviewed and updated as appropriate: allergies, current medications, past family history, past medical history, past social history, past surgical history and problem list.    Review of Systems   Constitutional: Positive for activity change and fatigue.   Eyes: Positive for visual disturbance.   Neurological: Positive for headache.   Psychiatric/Behavioral: Positive for dysphoric mood and depressed mood. Negative for self-injury. The patient is nervous/anxious.        Vitals:    09/04/19 1335   BP: 102/64   Resp: 16   Temp: 97 °F (36.1 °C)   TempSrc: Temporal   Weight: 64.9 kg (143 lb)   Height: 160 cm (62.99\")         Objective   Physical Exam   Constitutional: She is oriented to person, place, and time. Vital signs are normal. She appears well-developed and well-nourished. She is active.  Non-toxic appearance. She does not appear ill. No distress.   HENT:   Head: Normocephalic and atraumatic.   Right Ear: Hearing normal.   Left Ear: Hearing normal.   Mouth/Throat: Mucous membranes are not dry.   Eyes: No scleral icterus.   Neck: Phonation normal. Neck supple.   Pulmonary/Chest: Effort normal.   Neurological: She is alert and oriented to person, place, and time. She displays no " tremor. No cranial nerve deficit.   Skin: Skin is warm. No rash noted. She is not diaphoretic. No pallor.   Psychiatric: Judgment normal. Her speech is delayed. She is withdrawn. Cognition and memory are normal. She exhibits a depressed mood.   Nursing note and vitals reviewed.        Assessment/Plan     Problem List Items Addressed This Visit        Cardiovascular and Mediastinum    Migraine    Relevant Medications    buPROPion XL (WELLBUTRIN XL) 150 MG 24 hr tablet    vilazodone (VIIBRYD) 20 MG tablet tablet    Vilazodone HCl (VIIBRYD STARTER PACK) 10 & 20 MG kit    Erenumab-aooe (AIMOVIG) 70 MG/ML prefilled syringe       Other    Severe episode of recurrent major depressive disorder, without psychotic features (CMS/HCC) - Primary    Overview     · Failure of Zoloft, Lexapro, Cymbalta, Prozac          Relevant Medications    buPROPion XL (WELLBUTRIN XL) 150 MG 24 hr tablet    vilazodone (VIIBRYD) 20 MG tablet tablet    Vilazodone HCl (VIIBRYD STARTER PACK) 10 & 20 MG kit    Other Relevant Orders    Ambulatory Referral to Psychiatry    Presence of artificial eye    Relevant Medications    tobramycin-dexamethasone (TOBRADEX) 0.3-0.1 % ophthalmic ointment      Other Visit Diagnoses     Need for influenza vaccination        Relevant Orders    Flucelvax Quad=>4Years ([id number]) (Completed)          · Taper off Wellbutrin as tapering up on Viibryd.     Return in about 3 weeks (around 9/25/2019) for Follow up on current issues. or sooner if needed.    Pia Moe MD

## 2019-10-24 ENCOUNTER — PATIENT MESSAGE (OUTPATIENT)
Dept: INTERNAL MEDICINE | Facility: CLINIC | Age: 24
End: 2019-10-24

## 2019-10-24 DIAGNOSIS — T78.40XA ALLERGIC REACTION, INITIAL ENCOUNTER: Primary | ICD-10-CM

## 2019-10-24 RX ORDER — PREDNISONE 5 MG/1
1 TABLET ORAL TAKE AS DIRECTED
Qty: 21 EACH | Refills: 0 | Status: SHIPPED | OUTPATIENT
Start: 2019-10-24 | End: 2019-10-29 | Stop reason: HOSPADM

## 2019-10-24 NOTE — TELEPHONE ENCOUNTER
Patient wants steroid sent to [facility] on [address] in [place]. Her mom, Missy, would like a call back if possible on [phone]. Thanks.

## 2019-10-25 ENCOUNTER — HOSPITAL ENCOUNTER (INPATIENT)
Facility: HOSPITAL | Age: 24
LOS: 3 days | Discharge: HOME | End: 2019-10-29
Attending: EMERGENCY MEDICINE | Admitting: INTERNAL MEDICINE

## 2019-10-25 ENCOUNTER — TELEPHONE (OUTPATIENT)
Dept: INTERNAL MEDICINE | Facility: CLINIC | Age: 24
End: 2019-10-25

## 2019-10-25 DIAGNOSIS — L51.1 STEVENS-JOHNSON SYNDROME (HCC): Primary | ICD-10-CM

## 2019-10-25 PROBLEM — L27.0 DRUG ERUPTION: Status: ACTIVE | Noted: 2019-10-25

## 2019-10-25 LAB
ALBUMIN SERPL-MCNC: 4.2 G/DL (ref 3.5–5.2)
ALBUMIN/GLOB SERPL: 1.4 G/DL
ALP SERPL-CCNC: 86 U/L (ref 39–117)
ALT SERPL W P-5'-P-CCNC: 18 U/L (ref 1–33)
ANION GAP SERPL CALCULATED.3IONS-SCNC: 11 MMOL/L (ref 5–15)
AST SERPL-CCNC: 19 U/L (ref 1–32)
B-HCG UR QL: NEGATIVE
BACTERIA UR QL AUTO: NORMAL /HPF
BASOPHILS # BLD AUTO: 0.02 10*3/MM3 (ref 0–0.2)
BASOPHILS NFR BLD AUTO: 0.4 % (ref 0–1.5)
BILIRUB SERPL-MCNC: 0.3 MG/DL (ref 0.2–1.2)
BILIRUB UR QL STRIP: NEGATIVE
BUN BLD-MCNC: 9 MG/DL (ref 6–20)
BUN/CREAT SERPL: 13.2 (ref 7–25)
CALCIUM SPEC-SCNC: 9.3 MG/DL (ref 8.6–10.5)
CHLORIDE SERPL-SCNC: 101 MMOL/L (ref 98–107)
CLARITY UR: CLEAR
CO2 SERPL-SCNC: 26 MMOL/L (ref 22–29)
COLOR UR: YELLOW
CREAT BLD-MCNC: 0.68 MG/DL (ref 0.57–1)
CRP SERPL-MCNC: 0.13 MG/DL (ref 0–0.5)
D-LACTATE SERPL-SCNC: 0.7 MMOL/L (ref 0.5–2)
DEPRECATED RDW RBC AUTO: 36.5 FL (ref 37–54)
EOSINOPHIL # BLD AUTO: 0.01 10*3/MM3 (ref 0–0.4)
EOSINOPHIL NFR BLD AUTO: 0.2 % (ref 0.3–6.2)
ERYTHROCYTE [DISTWIDTH] IN BLOOD BY AUTOMATED COUNT: 11.6 % (ref 12.3–15.4)
ERYTHROCYTE [SEDIMENTATION RATE] IN BLOOD: 13 MM/HR (ref 0–20)
GFR SERPL CREATININE-BSD FRML MDRD: 108 ML/MIN/1.73
GLOBULIN UR ELPH-MCNC: 3 GM/DL
GLUCOSE BLD-MCNC: 109 MG/DL (ref 65–99)
GLUCOSE UR STRIP-MCNC: NEGATIVE MG/DL
HCT VFR BLD AUTO: 41.5 % (ref 34–46.6)
HGB BLD-MCNC: 14.1 G/DL (ref 12–15.9)
HGB UR QL STRIP.AUTO: ABNORMAL
HYALINE CASTS UR QL AUTO: NORMAL /LPF
IMM GRANULOCYTES # BLD AUTO: 0.02 10*3/MM3 (ref 0–0.05)
IMM GRANULOCYTES NFR BLD AUTO: 0.4 % (ref 0–0.5)
INTERNAL NEGATIVE CONTROL: NEGATIVE
INTERNAL POSITIVE CONTROL: POSITIVE
KETONES UR QL STRIP: NEGATIVE
LEUKOCYTE ESTERASE UR QL STRIP.AUTO: NEGATIVE
LYMPHOCYTES # BLD AUTO: 0.73 10*3/MM3 (ref 0.7–3.1)
LYMPHOCYTES NFR BLD AUTO: 15.8 % (ref 19.6–45.3)
Lab: NORMAL
MAGNESIUM SERPL-MCNC: 1.8 MG/DL (ref 1.6–2.6)
MCH RBC QN AUTO: 29.4 PG (ref 26.6–33)
MCHC RBC AUTO-ENTMCNC: 34 G/DL (ref 31.5–35.7)
MCV RBC AUTO: 86.6 FL (ref 79–97)
MONOCYTES # BLD AUTO: 0.19 10*3/MM3 (ref 0.1–0.9)
MONOCYTES NFR BLD AUTO: 4.1 % (ref 5–12)
NEUTROPHILS # BLD AUTO: 3.66 10*3/MM3 (ref 1.7–7)
NEUTROPHILS NFR BLD AUTO: 79.1 % (ref 42.7–76)
NITRITE UR QL STRIP: NEGATIVE
NRBC BLD AUTO-RTO: 0 /100 WBC (ref 0–0.2)
PH UR STRIP.AUTO: <=5 [PH] (ref 5–8)
PHOSPHATE SERPL-MCNC: 3.6 MG/DL (ref 2.5–4.5)
PLATELET # BLD AUTO: 239 10*3/MM3 (ref 140–450)
PMV BLD AUTO: 11.1 FL (ref 6–12)
POTASSIUM BLD-SCNC: 4 MMOL/L (ref 3.5–5.2)
PROT SERPL-MCNC: 7.2 G/DL (ref 6–8.5)
PROT UR QL STRIP: NEGATIVE
RBC # BLD AUTO: 4.79 10*6/MM3 (ref 3.77–5.28)
RBC # UR: NORMAL /HPF
REF LAB TEST METHOD: NORMAL
SODIUM BLD-SCNC: 138 MMOL/L (ref 136–145)
SP GR UR STRIP: 1.02 (ref 1–1.03)
SQUAMOUS #/AREA URNS HPF: NORMAL /HPF
UROBILINOGEN UR QL STRIP: ABNORMAL
WBC NRBC COR # BLD: 4.63 10*3/MM3 (ref 3.4–10.8)
WBC UR QL AUTO: NORMAL /HPF

## 2019-10-25 PROCEDURE — 85652 RBC SED RATE AUTOMATED: CPT | Performed by: EMERGENCY MEDICINE

## 2019-10-25 PROCEDURE — 83735 ASSAY OF MAGNESIUM: CPT | Performed by: EMERGENCY MEDICINE

## 2019-10-25 PROCEDURE — 80053 COMPREHEN METABOLIC PANEL: CPT | Performed by: EMERGENCY MEDICINE

## 2019-10-25 PROCEDURE — 85025 COMPLETE CBC W/AUTO DIFF WBC: CPT | Performed by: EMERGENCY MEDICINE

## 2019-10-25 PROCEDURE — 25010000002 METHYLPREDNISOLONE PER 125 MG: Performed by: EMERGENCY MEDICINE

## 2019-10-25 PROCEDURE — 84100 ASSAY OF PHOSPHORUS: CPT | Performed by: EMERGENCY MEDICINE

## 2019-10-25 PROCEDURE — 99284 EMERGENCY DEPT VISIT MOD MDM: CPT

## 2019-10-25 PROCEDURE — 25010000002 LORAZEPAM PER 2 MG: Performed by: EMERGENCY MEDICINE

## 2019-10-25 PROCEDURE — 99223 1ST HOSP IP/OBS HIGH 75: CPT | Performed by: INTERNAL MEDICINE

## 2019-10-25 PROCEDURE — 81001 URINALYSIS AUTO W/SCOPE: CPT | Performed by: EMERGENCY MEDICINE

## 2019-10-25 PROCEDURE — 83605 ASSAY OF LACTIC ACID: CPT | Performed by: EMERGENCY MEDICINE

## 2019-10-25 PROCEDURE — 81025 URINE PREGNANCY TEST: CPT | Performed by: EMERGENCY MEDICINE

## 2019-10-25 PROCEDURE — 86140 C-REACTIVE PROTEIN: CPT | Performed by: EMERGENCY MEDICINE

## 2019-10-25 RX ORDER — METHYLPREDNISOLONE SODIUM SUCCINATE 125 MG/2ML
125 INJECTION, POWDER, LYOPHILIZED, FOR SOLUTION INTRAMUSCULAR; INTRAVENOUS ONCE
Status: COMPLETED | OUTPATIENT
Start: 2019-10-25 | End: 2019-10-25

## 2019-10-25 RX ORDER — LIDOCAINE HYDROCHLORIDE 20 MG/ML
10 SOLUTION OROPHARYNGEAL ONCE
Status: COMPLETED | OUTPATIENT
Start: 2019-10-25 | End: 2019-10-25

## 2019-10-25 RX ORDER — LORAZEPAM 2 MG/ML
1 INJECTION INTRAMUSCULAR ONCE
Status: COMPLETED | OUTPATIENT
Start: 2019-10-25 | End: 2019-10-25

## 2019-10-25 RX ORDER — DIPHENHYDRAMINE HCL 25 MG
25 CAPSULE ORAL EVERY 6 HOURS PRN
Status: DISCONTINUED | OUTPATIENT
Start: 2019-10-25 | End: 2019-10-29 | Stop reason: HOSPADM

## 2019-10-25 RX ORDER — SODIUM CHLORIDE 0.9 % (FLUSH) 0.9 %
10 SYRINGE (ML) INJECTION AS NEEDED
Status: DISCONTINUED | OUTPATIENT
Start: 2019-10-25 | End: 2019-10-29 | Stop reason: HOSPADM

## 2019-10-25 RX ORDER — FAMOTIDINE 20 MG/1
20 TABLET, FILM COATED ORAL
Status: DISCONTINUED | OUTPATIENT
Start: 2019-10-26 | End: 2019-10-29 | Stop reason: HOSPADM

## 2019-10-25 RX ADMIN — SODIUM CHLORIDE 1000 ML: 9 INJECTION, SOLUTION INTRAVENOUS at 21:03

## 2019-10-25 RX ADMIN — METHYLPREDNISOLONE SODIUM SUCCINATE 125 MG: 125 INJECTION, POWDER, FOR SOLUTION INTRAMUSCULAR; INTRAVENOUS at 23:21

## 2019-10-25 RX ADMIN — LIDOCAINE HYDROCHLORIDE 10 ML: 20 SOLUTION ORAL; TOPICAL at 22:07

## 2019-10-25 RX ADMIN — LORAZEPAM 1 MG: 2 INJECTION INTRAMUSCULAR; INTRAVENOUS at 21:04

## 2019-10-25 NOTE — TELEPHONE ENCOUNTER
Fay Victoria called back and with patients mom . Stating that Laura didn't call in the right amount of prednisone per the pharmacist and she needed to call in Pred 20mg. I advised Eileen tell her that Laura called in what she felt comfortable calling in and if anything more was needed she was to be seen. I had all ready sent a patient message at 915am this morning because patient was just wanting to walk in and get a steroid shot. While on phone with eileen, Rae called and had Shikha on phone. She was wondering how she needed to put her on the schedule for a shot (just with MA). I told Rae to tell patient that she HAD to HAVE a appt for a injection as previously told. No appts available today because patient didn't call til after 2pm. I advised eileen to put on schedule for tomorrow if available appts. Laura called patient and mom last night and informed them that she needed to take antihistamines with prednisone. Patient stated that it was to late when called in and hadnt went and got steriods til today.

## 2019-10-25 NOTE — TELEPHONE ENCOUNTER
Patients mother called in wanting to see if it is possible to have her daughter come into the office for a nurse visit to receive a steroid injection shot. She stated her daughters rash has gotten worse this morning, and they were unable to receive the medication that was sent over yesterday due to arriving late to the pharmacy. She is also wanting to receive a school/ work excuse for her daughter to cover yesterday and today. Please contact patients mother if the patient is able to come in to receive the steroid injection shot.

## 2019-10-25 NOTE — TELEPHONE ENCOUNTER
Discussed Darya with mom over phone. Rash has progressed very fast and now associated with oral lesions. 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 I've discussed this with her. [place] was [place] vipin [place] [address] [place] I feel patient would be better served by ER visit in [place] due to concern for SJS, either [facility] or . Mom agrees and plans to turn around since [facility] not moving toward [place] and go to [place].     Please do not vane patient as a no show given situation.

## 2019-10-25 NOTE — TELEPHONE ENCOUNTER
Regarding: Prescription Question  Contact: [phone]  ----- Message from Emily Brenner sent at 10/24/2019  4:57 PM EDT -----       ----- Message from Shikha Nunez to Pia Moe MD sent at 10/24/2019  4:45 PM -----   Dr. Moe,      The behavioral health office placed me on the generic lamictal, lamotrigine (I think it's called). I have broken out in hives/rash as a severe allergic reaction, even on my my eye lids so that's scary. I have been on it for a couple of weeks, so I called them and they instructed me to discontinue the medication, which is fine but I asked about a steroid to counter react it a little faster than just wearing it off because typically with rashes it takes a week or more for me. They told me to contact my pcp because they can't prescribe medications that aren't psychiatric. Could you please send me a steroid pack to [facility] pharmacy in [place]? I'll attach a couple of photos just to show. Please let me know.

## 2019-10-26 PROCEDURE — 25010000002 METHYLPREDNISOLONE PER 125 MG: Performed by: INTERNAL MEDICINE

## 2019-10-26 PROCEDURE — 99233 SBSQ HOSP IP/OBS HIGH 50: CPT | Performed by: INTERNAL MEDICINE

## 2019-10-26 PROCEDURE — 63710000001 DIPHENHYDRAMINE PER 50 MG: Performed by: INTERNAL MEDICINE

## 2019-10-26 PROCEDURE — G0378 HOSPITAL OBSERVATION PER HR: HCPCS

## 2019-10-26 RX ORDER — LORAZEPAM 0.5 MG/1
0.5 TABLET ORAL ONCE
Status: COMPLETED | OUTPATIENT
Start: 2019-10-26 | End: 2019-10-26

## 2019-10-26 RX ORDER — LORAZEPAM 0.5 MG/1
0.5 TABLET ORAL EVERY 8 HOURS PRN
Status: DISCONTINUED | OUTPATIENT
Start: 2019-10-26 | End: 2019-10-29 | Stop reason: HOSPADM

## 2019-10-26 RX ORDER — ACETAMINOPHEN 325 MG/1
650 TABLET ORAL EVERY 6 HOURS PRN
Status: DISCONTINUED | OUTPATIENT
Start: 2019-10-26 | End: 2019-10-29 | Stop reason: HOSPADM

## 2019-10-26 RX ORDER — METHYLPREDNISOLONE SODIUM SUCCINATE 125 MG/2ML
60 INJECTION, POWDER, LYOPHILIZED, FOR SOLUTION INTRAMUSCULAR; INTRAVENOUS EVERY 12 HOURS
Status: DISCONTINUED | OUTPATIENT
Start: 2019-10-26 | End: 2019-10-29 | Stop reason: HOSPADM

## 2019-10-26 RX ORDER — HYDROXYZINE HYDROCHLORIDE 25 MG/1
25 TABLET, FILM COATED ORAL EVERY 6 HOURS PRN
Status: DISCONTINUED | OUTPATIENT
Start: 2019-10-26 | End: 2019-10-29 | Stop reason: HOSPADM

## 2019-10-26 RX ADMIN — DIPHENHYDRAMINE HYDROCHLORIDE 25 MG: 25 CAPSULE ORAL at 00:55

## 2019-10-26 RX ADMIN — HYDROXYZINE HYDROCHLORIDE 25 MG: 25 TABLET, FILM COATED ORAL at 12:56

## 2019-10-26 RX ADMIN — ACETAMINOPHEN 650 MG: 325 TABLET ORAL at 22:17

## 2019-10-26 RX ADMIN — METHYLPREDNISOLONE SODIUM SUCCINATE 60 MG: 125 INJECTION, POWDER, FOR SOLUTION INTRAMUSCULAR; INTRAVENOUS at 18:13

## 2019-10-26 RX ADMIN — FAMOTIDINE 20 MG: 20 TABLET ORAL at 00:57

## 2019-10-26 RX ADMIN — DIPHENHYDRAMINE HYDROCHLORIDE 25 MG: 25 CAPSULE ORAL at 15:56

## 2019-10-26 RX ADMIN — LORAZEPAM 0.5 MG: 0.5 TABLET ORAL at 04:09

## 2019-10-26 RX ADMIN — LORAZEPAM 0.5 MG: 0.5 TABLET ORAL at 23:23

## 2019-10-26 RX ADMIN — ACETAMINOPHEN 650 MG: 325 TABLET ORAL at 09:58

## 2019-10-26 RX ADMIN — FAMOTIDINE 20 MG: 20 TABLET ORAL at 09:00

## 2019-10-26 RX ADMIN — DIPHENHYDRAMINE HYDROCHLORIDE 25 MG: 25 CAPSULE ORAL at 22:17

## 2019-10-26 RX ADMIN — METHYLPREDNISOLONE SODIUM SUCCINATE 60 MG: 125 INJECTION, POWDER, FOR SOLUTION INTRAMUSCULAR; INTRAVENOUS at 09:45

## 2019-10-26 RX ADMIN — HYDROXYZINE HYDROCHLORIDE 25 MG: 25 TABLET, FILM COATED ORAL at 04:09

## 2019-10-26 RX ADMIN — DIPHENHYDRAMINE HYDROCHLORIDE 25 MG: 25 CAPSULE ORAL at 09:00

## 2019-10-26 RX ADMIN — HYDROXYZINE HYDROCHLORIDE 25 MG: 25 TABLET, FILM COATED ORAL at 20:43

## 2019-10-26 RX ADMIN — FAMOTIDINE 20 MG: 20 TABLET ORAL at 18:13

## 2019-10-27 LAB
ANION GAP SERPL CALCULATED.3IONS-SCNC: 11 MMOL/L (ref 5–15)
BUN BLD-MCNC: 9 MG/DL (ref 6–20)
BUN/CREAT SERPL: 16.4 (ref 7–25)
CALCIUM SPEC-SCNC: 9 MG/DL (ref 8.6–10.5)
CHLORIDE SERPL-SCNC: 106 MMOL/L (ref 98–107)
CO2 SERPL-SCNC: 23 MMOL/L (ref 22–29)
CREAT BLD-MCNC: 0.55 MG/DL (ref 0.57–1)
DEPRECATED RDW RBC AUTO: 38.8 FL (ref 37–54)
ERYTHROCYTE [DISTWIDTH] IN BLOOD BY AUTOMATED COUNT: 12.1 % (ref 12.3–15.4)
GFR SERPL CREATININE-BSD FRML MDRD: 138 ML/MIN/1.73
GLUCOSE BLD-MCNC: 174 MG/DL (ref 65–99)
HCT VFR BLD AUTO: 39.4 % (ref 34–46.6)
HGB BLD-MCNC: 13.1 G/DL (ref 12–15.9)
MCH RBC QN AUTO: 29.2 PG (ref 26.6–33)
MCHC RBC AUTO-ENTMCNC: 33.2 G/DL (ref 31.5–35.7)
MCV RBC AUTO: 87.9 FL (ref 79–97)
PLATELET # BLD AUTO: 247 10*3/MM3 (ref 140–450)
PMV BLD AUTO: 11.4 FL (ref 6–12)
POTASSIUM BLD-SCNC: 3.6 MMOL/L (ref 3.5–5.2)
RBC # BLD AUTO: 4.48 10*6/MM3 (ref 3.77–5.28)
SODIUM BLD-SCNC: 140 MMOL/L (ref 136–145)
WBC NRBC COR # BLD: 13.96 10*3/MM3 (ref 3.4–10.8)

## 2019-10-27 PROCEDURE — 99233 SBSQ HOSP IP/OBS HIGH 50: CPT | Performed by: INTERNAL MEDICINE

## 2019-10-27 PROCEDURE — 63710000001 DIPHENHYDRAMINE PER 50 MG: Performed by: INTERNAL MEDICINE

## 2019-10-27 PROCEDURE — 85027 COMPLETE CBC AUTOMATED: CPT | Performed by: INTERNAL MEDICINE

## 2019-10-27 PROCEDURE — G0378 HOSPITAL OBSERVATION PER HR: HCPCS

## 2019-10-27 PROCEDURE — 80048 BASIC METABOLIC PNL TOTAL CA: CPT | Performed by: INTERNAL MEDICINE

## 2019-10-27 PROCEDURE — 25010000002 METHYLPREDNISOLONE PER 125 MG: Performed by: INTERNAL MEDICINE

## 2019-10-27 RX ORDER — DIPHENHYDRAMINE HYDROCHLORIDE, ZINC ACETATE 2; .1 G/100G; G/100G
CREAM TOPICAL 3 TIMES DAILY PRN
Status: DISCONTINUED | OUTPATIENT
Start: 2019-10-27 | End: 2019-10-29 | Stop reason: HOSPADM

## 2019-10-27 RX ADMIN — DIPHENHYDRAMINE HYDROCHLORIDE 25 MG: 25 CAPSULE ORAL at 09:28

## 2019-10-27 RX ADMIN — HYDROXYZINE HYDROCHLORIDE 25 MG: 25 TABLET, FILM COATED ORAL at 13:18

## 2019-10-27 RX ADMIN — DIPHENHYDRAMINE HYDROCHLORIDE, ZINC ACETATE: 2; .1 CREAM TOPICAL at 14:08

## 2019-10-27 RX ADMIN — LORAZEPAM 0.5 MG: 0.5 TABLET ORAL at 21:35

## 2019-10-27 RX ADMIN — LORAZEPAM 0.5 MG: 0.5 TABLET ORAL at 11:51

## 2019-10-27 RX ADMIN — HYDROXYZINE HYDROCHLORIDE 25 MG: 25 TABLET, FILM COATED ORAL at 21:35

## 2019-10-27 RX ADMIN — FAMOTIDINE 20 MG: 20 TABLET ORAL at 09:28

## 2019-10-27 RX ADMIN — ACETAMINOPHEN 650 MG: 325 TABLET ORAL at 13:14

## 2019-10-27 RX ADMIN — FAMOTIDINE 20 MG: 20 TABLET ORAL at 18:33

## 2019-10-27 RX ADMIN — METHYLPREDNISOLONE SODIUM SUCCINATE 60 MG: 125 INJECTION, POWDER, FOR SOLUTION INTRAMUSCULAR; INTRAVENOUS at 18:33

## 2019-10-27 RX ADMIN — METHYLPREDNISOLONE SODIUM SUCCINATE 60 MG: 125 INJECTION, POWDER, FOR SOLUTION INTRAMUSCULAR; INTRAVENOUS at 05:46

## 2019-10-27 RX ADMIN — DIPHENHYDRAMINE HYDROCHLORIDE 25 MG: 25 CAPSULE ORAL at 03:46

## 2019-10-28 LAB
ANION GAP SERPL CALCULATED.3IONS-SCNC: 10 MMOL/L (ref 5–15)
BUN BLD-MCNC: 11 MG/DL (ref 6–20)
BUN/CREAT SERPL: 19.3 (ref 7–25)
CALCIUM SPEC-SCNC: 8.8 MG/DL (ref 8.6–10.5)
CHLORIDE SERPL-SCNC: 105 MMOL/L (ref 98–107)
CO2 SERPL-SCNC: 24 MMOL/L (ref 22–29)
CREAT BLD-MCNC: 0.57 MG/DL (ref 0.57–1)
DEPRECATED RDW RBC AUTO: 38.7 FL (ref 37–54)
ERYTHROCYTE [DISTWIDTH] IN BLOOD BY AUTOMATED COUNT: 12.1 % (ref 12.3–15.4)
GFR SERPL CREATININE-BSD FRML MDRD: 133 ML/MIN/1.73
GLUCOSE BLD-MCNC: 108 MG/DL (ref 65–99)
HCT VFR BLD AUTO: 39.6 % (ref 34–46.6)
HGB BLD-MCNC: 13 G/DL (ref 12–15.9)
MCH RBC QN AUTO: 28.9 PG (ref 26.6–33)
MCHC RBC AUTO-ENTMCNC: 32.8 G/DL (ref 31.5–35.7)
MCV RBC AUTO: 88 FL (ref 79–97)
PLATELET # BLD AUTO: 259 10*3/MM3 (ref 140–450)
PMV BLD AUTO: 11.5 FL (ref 6–12)
POTASSIUM BLD-SCNC: 3.6 MMOL/L (ref 3.5–5.2)
RBC # BLD AUTO: 4.5 10*6/MM3 (ref 3.77–5.28)
SODIUM BLD-SCNC: 139 MMOL/L (ref 136–145)
WBC NRBC COR # BLD: 11.69 10*3/MM3 (ref 3.4–10.8)

## 2019-10-28 PROCEDURE — 99232 SBSQ HOSP IP/OBS MODERATE 35: CPT | Performed by: INTERNAL MEDICINE

## 2019-10-28 PROCEDURE — 80048 BASIC METABOLIC PNL TOTAL CA: CPT | Performed by: INTERNAL MEDICINE

## 2019-10-28 PROCEDURE — 25010000002 METHYLPREDNISOLONE PER 125 MG: Performed by: INTERNAL MEDICINE

## 2019-10-28 PROCEDURE — 63710000001 DIPHENHYDRAMINE PER 50 MG: Performed by: INTERNAL MEDICINE

## 2019-10-28 PROCEDURE — 85027 COMPLETE CBC AUTOMATED: CPT | Performed by: INTERNAL MEDICINE

## 2019-10-28 RX ADMIN — METHYLPREDNISOLONE SODIUM SUCCINATE 60 MG: 125 INJECTION, POWDER, FOR SOLUTION INTRAMUSCULAR; INTRAVENOUS at 05:44

## 2019-10-28 RX ADMIN — HYDROXYZINE HYDROCHLORIDE 25 MG: 25 TABLET, FILM COATED ORAL at 03:27

## 2019-10-28 RX ADMIN — LORAZEPAM 0.5 MG: 0.5 TABLET ORAL at 23:16

## 2019-10-28 RX ADMIN — DIPHENHYDRAMINE HYDROCHLORIDE 25 MG: 25 CAPSULE ORAL at 00:47

## 2019-10-28 RX ADMIN — FAMOTIDINE 20 MG: 20 TABLET ORAL at 17:43

## 2019-10-28 RX ADMIN — LORAZEPAM 0.5 MG: 0.5 TABLET ORAL at 05:45

## 2019-10-28 RX ADMIN — DIPHENHYDRAMINE HYDROCHLORIDE 25 MG: 25 CAPSULE ORAL at 08:41

## 2019-10-28 RX ADMIN — ACETAMINOPHEN 650 MG: 325 TABLET ORAL at 20:30

## 2019-10-28 RX ADMIN — HYDROXYZINE HYDROCHLORIDE 25 MG: 25 TABLET, FILM COATED ORAL at 13:40

## 2019-10-28 RX ADMIN — DIPHENHYDRAMINE HYDROCHLORIDE 25 MG: 25 CAPSULE ORAL at 20:30

## 2019-10-28 RX ADMIN — METHYLPREDNISOLONE SODIUM SUCCINATE 60 MG: 125 INJECTION, POWDER, FOR SOLUTION INTRAMUSCULAR; INTRAVENOUS at 17:43

## 2019-10-28 RX ADMIN — FAMOTIDINE 20 MG: 20 TABLET ORAL at 08:41

## 2019-10-28 RX ADMIN — LORAZEPAM 0.5 MG: 0.5 TABLET ORAL at 14:40

## 2019-10-29 ENCOUNTER — TELEPHONE (OUTPATIENT)
Dept: INTERNAL MEDICINE | Facility: CLINIC | Age: 24
End: 2019-10-29

## 2019-10-29 VITALS
DIASTOLIC BLOOD PRESSURE: 70 MMHG | HEIGHT: 63 IN | BODY MASS INDEX: 24.8 KG/M2 | RESPIRATION RATE: 16 BRPM | SYSTOLIC BLOOD PRESSURE: 102 MMHG | TEMPERATURE: 97.8 F | HEART RATE: 58 BPM | WEIGHT: 140 LBS | OXYGEN SATURATION: 95 %

## 2019-10-29 LAB
ANION GAP SERPL CALCULATED.3IONS-SCNC: 10 MMOL/L (ref 5–15)
BUN BLD-MCNC: 11 MG/DL (ref 6–20)
BUN/CREAT SERPL: 21.2 (ref 7–25)
CALCIUM SPEC-SCNC: 9.2 MG/DL (ref 8.6–10.5)
CHLORIDE SERPL-SCNC: 103 MMOL/L (ref 98–107)
CO2 SERPL-SCNC: 26 MMOL/L (ref 22–29)
CREAT BLD-MCNC: 0.52 MG/DL (ref 0.57–1)
DEPRECATED RDW RBC AUTO: 38 FL (ref 37–54)
ERYTHROCYTE [DISTWIDTH] IN BLOOD BY AUTOMATED COUNT: 11.9 % (ref 12.3–15.4)
GFR SERPL CREATININE-BSD FRML MDRD: 147 ML/MIN/1.73
GLUCOSE BLD-MCNC: 96 MG/DL (ref 65–99)
HCT VFR BLD AUTO: 41.5 % (ref 34–46.6)
HGB BLD-MCNC: 13.7 G/DL (ref 12–15.9)
MCH RBC QN AUTO: 28.8 PG (ref 26.6–33)
MCHC RBC AUTO-ENTMCNC: 33 G/DL (ref 31.5–35.7)
MCV RBC AUTO: 87.4 FL (ref 79–97)
PLATELET # BLD AUTO: 289 10*3/MM3 (ref 140–450)
PMV BLD AUTO: 11.1 FL (ref 6–12)
POTASSIUM BLD-SCNC: 4 MMOL/L (ref 3.5–5.2)
RBC # BLD AUTO: 4.75 10*6/MM3 (ref 3.77–5.28)
SODIUM BLD-SCNC: 139 MMOL/L (ref 136–145)
WBC NRBC COR # BLD: 13.53 10*3/MM3 (ref 3.4–10.8)

## 2019-10-29 PROCEDURE — 80048 BASIC METABOLIC PNL TOTAL CA: CPT | Performed by: INTERNAL MEDICINE

## 2019-10-29 PROCEDURE — 99239 HOSP IP/OBS DSCHRG MGMT >30: CPT | Performed by: INTERNAL MEDICINE

## 2019-10-29 PROCEDURE — 85027 COMPLETE CBC AUTOMATED: CPT | Performed by: INTERNAL MEDICINE

## 2019-10-29 PROCEDURE — 25010000002 METHYLPREDNISOLONE PER 125 MG: Performed by: INTERNAL MEDICINE

## 2019-10-29 RX ORDER — PREDNISONE 20 MG/1
TABLET ORAL
Qty: 7 TABLET | Refills: 0 | Status: SHIPPED | OUTPATIENT
Start: 2019-10-29 | End: 2019-10-31

## 2019-10-29 RX ADMIN — HYDROXYZINE HYDROCHLORIDE 25 MG: 25 TABLET, FILM COATED ORAL at 09:53

## 2019-10-29 RX ADMIN — HYDROXYZINE HYDROCHLORIDE 25 MG: 25 TABLET, FILM COATED ORAL at 01:19

## 2019-10-29 RX ADMIN — METHYLPREDNISOLONE SODIUM SUCCINATE 60 MG: 125 INJECTION, POWDER, FOR SOLUTION INTRAMUSCULAR; INTRAVENOUS at 05:16

## 2019-10-29 NOTE — TELEPHONE ENCOUNTER
I want to see her. I've been discussing with her mother throughout course of admission.     Will double book 10/31 at 11:30. Mother aware it's a db and that I may run a bit behind, and it's fine if she gets here at 1145.

## 2019-10-29 NOTE — TELEPHONE ENCOUNTER
Patient to discharge from [facility] 10/29/19    SJS- Reaction to Medication    F/U with Tom on 11/6 at 1

## 2019-10-30 ENCOUNTER — TRANSITIONAL CARE MANAGEMENT TELEPHONE ENCOUNTER (OUTPATIENT)
Dept: INTERNAL MEDICINE | Facility: CLINIC | Age: 24
End: 2019-10-30

## 2019-10-30 NOTE — OUTREACH NOTE
The patient was discharged from [facility] 10/29/19 and has a hospital follow up scheduled with PCP Dr. Moe 10/31/19 which is within 2 business days of hospital discharge.

## 2019-10-31 ENCOUNTER — OFFICE VISIT (OUTPATIENT)
Dept: INTERNAL MEDICINE | Facility: CLINIC | Age: 24
End: 2019-10-31

## 2019-10-31 VITALS
HEIGHT: 63 IN | SYSTOLIC BLOOD PRESSURE: 98 MMHG | HEART RATE: 71 BPM | WEIGHT: 149 LBS | RESPIRATION RATE: 16 BRPM | BODY MASS INDEX: 26.4 KG/M2 | TEMPERATURE: 97.8 F | OXYGEN SATURATION: 98 % | DIASTOLIC BLOOD PRESSURE: 64 MMHG

## 2019-10-31 DIAGNOSIS — L51.1 STEVENS-JOHNSON SYNDROME (HCC): Primary | ICD-10-CM

## 2019-10-31 PROCEDURE — 99495 TRANSJ CARE MGMT MOD F2F 14D: CPT | Performed by: FAMILY MEDICINE

## 2019-10-31 RX ORDER — PREDNISONE 20 MG/1
TABLET ORAL
Qty: 42 TABLET | Refills: 0 | Status: SHIPPED | OUTPATIENT
Start: 2019-10-31 | End: 2019-11-06

## 2019-10-31 NOTE — PROGRESS NOTES
"Transitional Care Follow Up Visit  Subjective     Shikha Nunez is a 23 y.o. female who presents for a transitional care management visit.    Within 48 business hours after discharge our office contacted her via telephone to coordinate her care and needs.      I reviewed and discussed the details of that call along with the discharge summary, hospital problems, inpatient lab results, inpatient diagnostic studies, and consultation reports with Shikha.     Current outpatient and discharge medications have been reconciled for the patient.  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 She'd been started on Lamictal about a month prior and was still on lowest dose. Rash had begun approx 10-23 and worsened quickly and was associated with oral lesions day of ER presentation. Admitted at [facility] and monitored for \"drug eruption\" and treated with steroids. She was on solumedrol 60 mg IV bid at one point, then discharged on a taper. She continues to itch and she has widespread pain. She is taking the prednisone taper as prescribed, today she dropped dose as instructed and facial lesions are starting to become more red, swollen, itchy, seem to be relapsing. She has healing skin lesions across torso, arms, legs. She remembers as a child having a similar reaction to Bactrim. She has followed up with [facility], she continues vistaril at this time. Medicines are sedating and she's not able to go to class nor work.      The following portions of the patient's history were reviewed and updated as appropriate: " allergies, current medications, past family history, past medical history, past social history, past surgical history and problem list.    Review of Systems   Constitutional: Positive for appetite change and fatigue. Negative for fever.   HENT: Positive for sore throat and trouble swallowing (during admission).    Respiratory: Positive for cough (during admission) and shortness of breath (druing admission).    Musculoskeletal: Positive for arthralgias and myalgias.   Skin: Positive for color change and rash.   Psychiatric/Behavioral: Positive for sleep disturbance.       Objective   Physical Exam   Constitutional: She is oriented to person, place, and time. Vital signs are normal. She appears well-developed and well-nourished. She is active.  Non-toxic appearance. She does not have a sickly appearance. No distress.   HENT:   Head: Normocephalic.   Right Ear: Hearing normal.   Left Ear: Hearing normal.   Mouth/Throat: Oropharynx is clear and moist. Normal dentition. No uvula swelling.   Erythematous macules across scalp.  No hair loss   Pulmonary/Chest: Effort normal.   Neurological: She is alert and oriented to person, place, and time. She displays no tremor. No cranial nerve deficit. Gait normal.   Skin: Skin is warm. Rash noted. Rash is macular (red-brown macules scattered across torso (front and back) as well as arms and lower legs) and nodular (face bilateral cheeks, erythematous). She is not diaphoretic. No cyanosis.   Psychiatric: She has a normal mood and affect. Her speech is normal and behavior is normal. Judgment and thought content normal. Cognition and memory are normal.   Nursing note and vitals reviewed.      Assessment/Plan   Shikha was seen today for transitional care management.    Diagnoses and all orders for this visit:    Eddy-George syndrome (CMS/HCC)  -     predniSONE (DELTASONE) 20 MG tablet; Take 4 tablets by mouth daily for 4 days, then 3 tabs for 4 days, then 2 tabs for 4 days, then 1  tab for 4 days, then 1/2 tab for 4 days      Current outpatient and discharge medications have been reconciled for the patient.  Reviewed by: Pia Moe MD    Discussed tx with steroids, suspect taper was not strong enough nor long enough. Increasing dose back up and will taper slower. Patient knows to avoid AEDs such as Lamictal in the future along with sulfa medicines.

## 2019-11-05 ENCOUNTER — TELEPHONE (OUTPATIENT)
Dept: INTERNAL MEDICINE | Facility: CLINIC | Age: 24
End: 2019-11-05

## 2019-11-05 NOTE — TELEPHONE ENCOUNTER
Patients mother called regarding the allergic reaction Shikha had.  She states that they think the spots are coming back and would like to know what to do about it.  Please advise.  Missy can be reached at [phone].  Thank you.  .

## 2019-11-06 DIAGNOSIS — L51.1 STEVENS-JOHNSON SYNDROME (HCC): ICD-10-CM

## 2019-11-06 RX ORDER — PREDNISONE 20 MG/1
TABLET ORAL
Qty: 30 TABLET | Refills: 0 | Status: SHIPPED | OUTPATIENT
Start: 2019-11-06 | End: 2019-11-08 | Stop reason: SDUPTHER

## 2019-11-08 ENCOUNTER — PATIENT MESSAGE (OUTPATIENT)
Dept: INTERNAL MEDICINE | Facility: CLINIC | Age: 24
End: 2019-11-08

## 2019-11-08 DIAGNOSIS — L51.1 STEVENS-JOHNSON SYNDROME (HCC): ICD-10-CM

## 2019-11-08 RX ORDER — TRAMADOL HYDROCHLORIDE 50 MG/1
50 TABLET ORAL 2 TIMES DAILY PRN
Qty: 60 TABLET | Refills: 1 | Status: SHIPPED | OUTPATIENT
Start: 2019-11-08 | End: 2020-05-08

## 2019-11-08 RX ORDER — PREDNISONE 20 MG/1
TABLET ORAL
Qty: 30 TABLET | Refills: 1 | Status: SHIPPED | OUTPATIENT
Start: 2019-11-08 | End: 2019-11-19 | Stop reason: SDUPTHER

## 2019-11-08 NOTE — TELEPHONE ENCOUNTER
From: Shikha Nunez  To: Pia Moe MD  Sent: 11/8/2019 12:37 PM EST  Subject: Prescription Question    Hey,  Just wanted to let you know I was seen by dermatologist Dr. Orr this morning. He did a biopsy, and wants me to remain on 4 prednisone/day for the time being. He also is sending prednisone cream so we will be fighting it inside and out. He stated it can take 8-12 weeks to get through this bc even when the med is out of the body, the immune system is still angry. Can you send something other than ibuprofen for the body pain because it is tying more unbearable and causing it to be difficult to walk. I’ve taken 800mg ibuprofen 3/day since seeing you last and not helping at all. I feel like I’ve aged to 81 something years old since this reaction began. Please let me know what to do or take.   Thank you luis,  Darya/elijah

## 2019-11-08 NOTE — TELEPHONE ENCOUNTER
From: Shikha Nunez  To: Pia Moe MD  Sent: 11/8/2019 3:59 PM EST  Subject: Prescription Question    We went to  the prednisone (30) you sent the other day, but they said they didn’t have it. We had just been using the tablets discharged with til needed them for the changed dose. Could you resend them to [facility] so I can get them when I get back to [place]?   Thank you doc. You can’t know how much you’ve helped me with my anxiety during this illness. Just knowing your on point helps me breathe a little easier.     Darya/mp:)

## 2019-11-19 ENCOUNTER — PATIENT MESSAGE (OUTPATIENT)
Dept: INTERNAL MEDICINE | Facility: CLINIC | Age: 24
End: 2019-11-19

## 2019-11-19 DIAGNOSIS — L51.1 STEVENS-JOHNSON SYNDROME (HCC): ICD-10-CM

## 2019-11-19 RX ORDER — PREDNISONE 20 MG/1
60 TABLET ORAL DAILY
Qty: 30 TABLET | Refills: 1 | Status: SHIPPED | OUTPATIENT
Start: 2019-11-19 | End: 2019-11-19 | Stop reason: SDUPTHER

## 2019-11-19 RX ORDER — PREDNISONE 20 MG/1
80 TABLET ORAL DAILY
Qty: 120 TABLET | Refills: 1 | Status: SHIPPED | OUTPATIENT
Start: 2019-11-19 | End: 2019-12-02

## 2019-11-19 NOTE — TELEPHONE ENCOUNTER
From: Shikha Nunez  To: Pia Moe MD  Sent: 11/19/2019 10:25 AM EST  Subject: Prescription Question    Could you send another refill of the steroid? I don’t have enough to take for today, and the dermatologist instructed me to remain on the 4 tablets/day until biopsy results returned, and further instructed. The rash is still bright on neck/chest area and intermittent on face. Most of the other spots have faded but still noticeable. The pain is unbearable, and have tried to take only one of the meds you sent, but some days have had to take it twice to bear it. I ache all over my body, and don’t know what to do. If you have any suggestions please let me know. The dermatologist said this could take 8-12 weeks to go away, and that has worsened my anxiety.   Thank you doc, we don’t know what we would do without you!     Darya/elijah

## 2019-11-20 DIAGNOSIS — L70.8 STEROID-INDUCED ACNE: Primary | ICD-10-CM

## 2019-11-20 DIAGNOSIS — T38.0X5A STEROID-INDUCED ACNE: Primary | ICD-10-CM

## 2019-11-20 RX ORDER — TRETINOIN 0.05 G/100G
1 GEL TOPICAL NIGHTLY
Qty: 45 G | Refills: 1 | Status: SHIPPED | OUTPATIENT
Start: 2019-11-20 | End: 2021-09-30

## 2019-12-02 ENCOUNTER — OFFICE VISIT (OUTPATIENT)
Dept: INTERNAL MEDICINE | Facility: CLINIC | Age: 24
End: 2019-12-02

## 2019-12-02 ENCOUNTER — APPOINTMENT (OUTPATIENT)
Dept: LAB | Facility: HOSPITAL | Age: 24
End: 2019-12-02

## 2019-12-02 VITALS
BODY MASS INDEX: 28.2 KG/M2 | RESPIRATION RATE: 16 BRPM | WEIGHT: 159.13 LBS | DIASTOLIC BLOOD PRESSURE: 68 MMHG | TEMPERATURE: 98.4 F | HEIGHT: 63 IN | HEART RATE: 105 BPM | OXYGEN SATURATION: 97 % | SYSTOLIC BLOOD PRESSURE: 102 MMHG

## 2019-12-02 DIAGNOSIS — G43.809 OTHER MIGRAINE WITHOUT STATUS MIGRAINOSUS, NOT INTRACTABLE: ICD-10-CM

## 2019-12-02 DIAGNOSIS — E53.8 LOW FOLATE: ICD-10-CM

## 2019-12-02 DIAGNOSIS — M89.8X9 BONE PAIN: Primary | ICD-10-CM

## 2019-12-02 DIAGNOSIS — R61 NIGHT SWEATS: ICD-10-CM

## 2019-12-02 DIAGNOSIS — M25.50 POLYARTHRALGIA: ICD-10-CM

## 2019-12-02 LAB
25(OH)D3 SERPL-MCNC: 13.1 NG/ML (ref 30–100)
ERYTHROCYTE [SEDIMENTATION RATE] IN BLOOD: 4 MM/HR (ref 0–20)
FOLATE SERPL-MCNC: 18.5 NG/ML (ref 4.78–24.2)
VIT B12 BLD-MCNC: 479 PG/ML (ref 211–946)

## 2019-12-02 PROCEDURE — 82306 VITAMIN D 25 HYDROXY: CPT | Performed by: FAMILY MEDICINE

## 2019-12-02 PROCEDURE — 99213 OFFICE O/P EST LOW 20 MIN: CPT | Performed by: FAMILY MEDICINE

## 2019-12-02 PROCEDURE — 85025 COMPLETE CBC W/AUTO DIFF WBC: CPT | Performed by: FAMILY MEDICINE

## 2019-12-02 PROCEDURE — 82607 VITAMIN B-12: CPT | Performed by: FAMILY MEDICINE

## 2019-12-02 PROCEDURE — 82746 ASSAY OF FOLIC ACID SERUM: CPT | Performed by: FAMILY MEDICINE

## 2019-12-02 PROCEDURE — 85652 RBC SED RATE AUTOMATED: CPT | Performed by: FAMILY MEDICINE

## 2019-12-02 PROCEDURE — 85007 BL SMEAR W/DIFF WBC COUNT: CPT | Performed by: FAMILY MEDICINE

## 2019-12-02 PROCEDURE — 36415 COLL VENOUS BLD VENIPUNCTURE: CPT | Performed by: FAMILY MEDICINE

## 2019-12-02 NOTE — PROGRESS NOTES
"Subjective    Shikha Nunez is a 23 y.o. female here for:    Chief Complaint   Patient presents with   • Pain     pain all over body even her skin is sensitive to touch        Migraines are a chronic issue that have improved with use of Aimovig. Eye exam up to date.         Widespread pain across body. Off prednisone for about a week now. Hurts when she doesn't move, hurts worse when she does move. Skin very sensitive, feels like she has been hit by a bus. Feels like she has a layer of bruising under skin. Has trouble standing for long and she has been walking with a cane. She's worried this could be fibromyalgia. She had the \"all over bruising pain\" before the SJS episode. When she got out of the hospital she started having the bone pain and it's worsening. Was told on genetic testing through psych that she is low on folate.    The following portions of the patient's history were reviewed and updated as appropriate: allergies, current medications, past family history, past medical history, past social history, past surgical history and problem list.    Review of Systems   Constitutional: Positive for fatigue.   Musculoskeletal: Positive for arthralgias and myalgias.   Psychiatric/Behavioral: Positive for dysphoric mood.       Visit Vitals  /68   Pulse 105   Temp 98.4 °F (36.9 °C) (Temporal)   Resp 16   Ht 160 cm (62.99\")   Wt 72.2 kg (159 lb 2 oz)   LMP 11/19/2019   SpO2 97%   BMI 28.19 kg/m²         Objective   Physical Exam   Constitutional: She is oriented to person, place, and time. Vital signs are normal. She appears well-developed and well-nourished. She is active.  Non-toxic appearance. She does not appear ill. No distress.   HENT:   Head: Normocephalic and atraumatic. Hair is normal.   Right Ear: Hearing normal.   Left Ear: Hearing normal.   Mouth/Throat: Mucous membranes are not dry.   Eyes: No scleral icterus.   Neck: Phonation normal. Neck supple.   Pulmonary/Chest: Effort normal.   Neurological: " She is alert and oriented to person, place, and time. She displays no tremor. No cranial nerve deficit.   Skin: Skin is warm. No rash noted. She is not diaphoretic. No pallor.   Psychiatric: She has a normal mood and affect. Her speech is normal and behavior is normal. Judgment and thought content normal. Cognition and memory are normal.   Nursing note and vitals reviewed.        Assessment/Plan     Problem List Items Addressed This Visit        Cardiovascular and Mediastinum    Migraine    Relevant Medications    Erenumab-aooe (AIMOVIG) 70 MG/ML prefilled syringe      Other Visit Diagnoses     Bone pain    -  Primary    Relevant Orders    Vitamin D 25 Hydroxy    PTH, Intact    Calcium, Ionized    CBC & Differential    Vitamin B12    Folate    SHOLA by IFA, Reflex 9-biomarkers profile    C-reactive Protein    Sedimentation Rate    Comprehensive Metabolic Panel    TSH+Free T4    Uric Acid    Lactate Dehydrogenase    Night sweats        Relevant Orders    TSH+Free T4    Uric Acid    Lactate Dehydrogenase    Low folate        Relevant Orders    Folate    Polyarthralgia        Relevant Orders    Vitamin D 25 Hydroxy    PTH, Intact    Calcium, Ionized    CBC & Differential    Vitamin B12    Folate    SHOLA by IFA, Reflex 9-biomarkers profile    C-reactive Protein    Sedimentation Rate    Comprehensive Metabolic Panel    TSH+Free T4    Uric Acid    Lactate Dehydrogenase          ·     Pia Moe MD

## 2019-12-03 LAB
ANISOCYTOSIS BLD QL: NORMAL
BASOPHILS # BLD MANUAL: 0.09 10*3/MM3 (ref 0–0.2)
BASOPHILS NFR BLD AUTO: 1 % (ref 0–1.5)
DEPRECATED RDW RBC AUTO: 40.3 FL (ref 37–54)
EOSINOPHIL # BLD MANUAL: 0.09 10*3/MM3 (ref 0–0.4)
EOSINOPHIL NFR BLD MANUAL: 1 % (ref 0.3–6.2)
ERYTHROCYTE [DISTWIDTH] IN BLOOD BY AUTOMATED COUNT: 12.8 % (ref 12.3–15.4)
HCT VFR BLD AUTO: 41.9 % (ref 34–46.6)
HGB BLD-MCNC: 14.9 G/DL (ref 12–15.9)
LYMPHOCYTES # BLD MANUAL: 1.95 10*3/MM3 (ref 0.7–3.1)
LYMPHOCYTES NFR BLD MANUAL: 21.2 % (ref 19.6–45.3)
LYMPHOCYTES NFR BLD MANUAL: 9.1 % (ref 5–12)
MCH RBC QN AUTO: 31.2 PG (ref 26.6–33)
MCHC RBC AUTO-ENTMCNC: 35.6 G/DL (ref 31.5–35.7)
MCV RBC AUTO: 87.7 FL (ref 79–97)
MICROCYTES BLD QL: NORMAL
MONOCYTES # BLD AUTO: 0.84 10*3/MM3 (ref 0.1–0.9)
NEUTROPHILS # BLD AUTO: 6.22 10*3/MM3 (ref 1.7–7)
NEUTROPHILS NFR BLD MANUAL: 67.7 % (ref 42.7–76)
PLAT MORPH BLD: NORMAL
PLATELET # BLD AUTO: 283 10*3/MM3 (ref 140–450)
PMV BLD AUTO: 10.2 FL (ref 6–12)
RBC # BLD AUTO: 4.78 10*6/MM3 (ref 3.77–5.28)
WBC MORPH BLD: NORMAL
WBC NRBC COR # BLD: 9.19 10*3/MM3 (ref 3.4–10.8)

## 2019-12-04 DIAGNOSIS — E55.9 VITAMIN D DEFICIENCY: Primary | ICD-10-CM

## 2019-12-04 RX ORDER — CHOLECALCIFEROL (VITAMIN D3) 1250 MCG
50000 CAPSULE ORAL
Qty: 12 CAPSULE | Refills: 3 | Status: SHIPPED | OUTPATIENT
Start: 2019-12-04 | End: 2020-03-17 | Stop reason: SDUPTHER

## 2020-01-20 ENCOUNTER — PATIENT MESSAGE (OUTPATIENT)
Dept: INTERNAL MEDICINE | Facility: CLINIC | Age: 25
End: 2020-01-20

## 2020-01-23 RX ORDER — HYDROXYZINE HYDROCHLORIDE 25 MG/1
25-50 TABLET, FILM COATED ORAL NIGHTLY PRN
Qty: 60 TABLET | Refills: 0 | Status: SHIPPED | OUTPATIENT
Start: 2020-01-23 | End: 2021-09-30

## 2020-01-24 RX ORDER — ONDANSETRON 8 MG/1
8 TABLET, ORALLY DISINTEGRATING ORAL DAILY PRN
Qty: 90 TABLET | Refills: 3 | Status: SHIPPED | OUTPATIENT
Start: 2020-01-24 | End: 2021-09-30

## 2020-01-24 NOTE — TELEPHONE ENCOUNTER
From: Shikha Nunez  To: Pia Moe MD  Sent: 1/20/2020 9:50 AM EST  Subject: Prescription Question    Would it be possible to send zofran to take once daily, and a 90 day supply? I must take it prior to taking the medication for mood to keep it down and not feel like I’m going to throw up constantly. If so, could you send it to [facility]’s pharmacy cause I’m not on campus today.   Also, I was wanting to ask your thoughts on a reaction to heat I keep having. Ever since having the severe reaction to the lamictal, I will break out if splotches if I get over-heated or if I get more stressed than usual. They will go away, eventually, but just not sure why it keeps occurring... do you think this will continue indefinitely, or because it hasn’t been long enough to completely recover from the reaction?   Thanks  Darya

## 2020-02-25 ENCOUNTER — TELEPHONE (OUTPATIENT)
Dept: INTERNAL MEDICINE | Facility: CLINIC | Age: 25
End: 2020-02-25

## 2020-03-17 ENCOUNTER — PATIENT MESSAGE (OUTPATIENT)
Dept: INTERNAL MEDICINE | Facility: CLINIC | Age: 25
End: 2020-03-17

## 2020-03-17 DIAGNOSIS — E55.9 VITAMIN D DEFICIENCY: ICD-10-CM

## 2020-03-17 RX ORDER — CHOLECALCIFEROL (VITAMIN D3) 1250 MCG
50000 CAPSULE ORAL
Qty: 12 CAPSULE | Refills: 3 | Status: SHIPPED | OUTPATIENT
Start: 2020-03-17 | End: 2020-08-07 | Stop reason: SDUPTHER

## 2020-03-17 NOTE — TELEPHONE ENCOUNTER
From: Shikha Nunez  To: Pia Moe MD  Sent: 3/17/2020 2:49 PM EDT  Subject: Prescription Question    Could you please resend vitamin D to the pharmacy please. Could it also be sent to [facility] pharmacy. I didn't get to pick it up when initially sent so I'm feeling fatigue continuously.     Thank you,   Logan

## 2020-03-20 ENCOUNTER — TELEPHONE (OUTPATIENT)
Dept: INTERNAL MEDICINE | Facility: CLINIC | Age: 25
End: 2020-03-20

## 2020-03-20 NOTE — TELEPHONE ENCOUNTER
Pts mother called and stated they are expecting pt to come to work tomorrow and is requesting a note be placed in her chart so she can be excused from work because of her weaken immune system. Pt is requesting to have this note by this evening if possible. Please advise [phone]

## 2020-05-06 ENCOUNTER — PATIENT MESSAGE (OUTPATIENT)
Dept: INTERNAL MEDICINE | Facility: CLINIC | Age: 25
End: 2020-05-06

## 2020-05-07 NOTE — TELEPHONE ENCOUNTER
From: Shikha Nunez  To: Pia Moe MD  Sent: 5/6/2020 2:37 PM EDT  Subject: Prescription Question    I was wondering if it would be possible for you to send Cymbalta (I think that’s what it is mom takes) for me? I haven’t been on a medicine since [place]. I feel like I do need one, but so many medications have had intolerable or severe side effects. I have become afraid of many medications. Please let me know if it is possible, and if I need to have a virtual appt with you just let me know when. I just have felt more stressed lately, and don’t want to get back to feeling really down or in a dark place.   Thank you,   Darya

## 2020-05-07 NOTE — PROGRESS NOTES
You have chosen to receive care through a telehealth visit.  Do you consent to use a video/audio connection for your medical care today? Yes    On this video visit is Lanny WHEELER CMA, Dr. Moe, and Shikha Nunez.

## 2020-05-08 ENCOUNTER — TELEMEDICINE (OUTPATIENT)
Dept: INTERNAL MEDICINE | Facility: CLINIC | Age: 25
End: 2020-05-08

## 2020-05-08 DIAGNOSIS — Z88.9 HISTORY OF ADVERSE DRUG REACTION: ICD-10-CM

## 2020-05-08 DIAGNOSIS — F33.2 SEVERE EPISODE OF RECURRENT MAJOR DEPRESSIVE DISORDER, WITHOUT PSYCHOTIC FEATURES (HCC): Primary | ICD-10-CM

## 2020-05-08 PROCEDURE — 99214 OFFICE O/P EST MOD 30 MIN: CPT | Performed by: FAMILY MEDICINE

## 2020-05-08 RX ORDER — DULOXETIN HYDROCHLORIDE 20 MG/1
20 CAPSULE, DELAYED RELEASE ORAL DAILY
Qty: 90 CAPSULE | Refills: 3 | Status: SHIPPED | OUTPATIENT
Start: 2020-05-08 | End: 2020-08-07

## 2020-05-08 NOTE — PATIENT INSTRUCTIONS
Major Depressive Disorder, Adult  Major depressive disorder (MDD) is a mental health condition. MDD often makes you feel sad, hopeless, or helpless. MDD can also cause symptoms in your body. MDD can affect your:  · Work.  · School.  · Relationships.  · Other normal activities.  MDD can range from mild to very bad. It may occur once (single episode MDD). It can also occur many times (recurrent MDD).  The main symptoms of MDD often include:  · Feeling sad, depressed, or irritable most of the time.  · Loss of interest.  MDD symptoms also include:  · Sleeping too much or too little.  · Eating too much or too little.  · A change in your weight.  · Feeling tired (fatigue) or having low energy.  · Feeling worthless.  · Feeling guilty.  · Trouble making decisions.  · Trouble thinking clearly.  · Thoughts of suicide or harming others.  · Feeling weak.  · Feeling agitated.  · Keeping yourself from being around other people (isolation).  Follow these instructions at home:  Activity  · Do these things as told by your doctor:  ? Go back to your normal activities.  ? Exercise regularly.  ? Spend time outdoors.  Alcohol  · Talk with your doctor about how alcohol can affect your antidepressant medicines.  · Do not drink alcohol. Or, limit how much alcohol you drink.  ? This means no more than 1 drink a day for nonpregnant women and 2 drinks a day for men. One drink equals one of these:  § 12 oz of beer.  § 5 oz of wine.  § 1½ oz of hard liquor.  General instructions  · Take over-the-counter and prescription medicines only as told by your doctor.  · Eat a healthy diet.  · Get plenty of sleep.  · Find activities that you enjoy. Make time to do them.  · Think about joining a support group. Your doctor may be able to suggest a group for you.  · Keep all follow-up visits as told by your doctor. This is important.  Where to find more information:  · National [place] on Mental Illness:  ? www.mary.org  · [place] National [place] of Mental  Health:  ? www.[facility].gov  · National Suicide Prevention Lifeline:  ? 8-354-419-1978. This is free, 24-hour help.  Contact a doctor if:  · Your symptoms get worse.  · You have new symptoms.  Get help right away if:  · You self-harm.  · You see, hear, taste, smell, or feel things that are not present (hallucinate).  If you ever feel like you may hurt yourself or others, or have thoughts about taking your own life, get help right away. You can go to your nearest emergency department or call:  · Your local emergency services (911 in the [place]  · A suicide crisis helpline, such as the National Suicide Prevention Lifeline:  ? 7-239-981-0010. This is open 24 hours a day.  This information is not intended to replace advice given to you by your health care provider. Make sure you discuss any questions you have with your health care provider.  Document Released: 11/28/2016 Document Revised: 09/03/2017 Document Reviewed: 09/03/2017  [employer] Interactive Patient Education © 2020 [employer]

## 2020-05-08 NOTE — ASSESSMENT & PLAN NOTE
Psychological condition is Not controlled.  Continue therapy. Pt wishes to try Cymbalta again, does not recall taking in past.  Psychological condition  will be reassessed in 2 weeks.

## 2020-05-08 NOTE — PROGRESS NOTES
Subjective    Shikha Nunez is a 24 y.o. female here for:  Chief Complaint   Patient presents with   • Depression     Pt states she has a problem with meds but feels she needs something for depression or a mood stabilizer. Her therapist has diagnosed her with manic depression.    • Anxiety     Pt states she can not keep taking the hydroxyzine because it makes her sleep too much, then when she wakes up she is very groggy. Sleeping 12 hours or more a day when she takes it.        Patient presents for a virtual visit. This visit was scheduled as a video visit to comply with patient safety concerns in accordance with CDC recommendations.     History of Present Illness   Depression chronic issue not controlled. No plans to harm self, has supportive family (mother scheduled visit today). She plans to return to therapist in [place] but would like to try another medicine for depression. Mom takes Cymbalta and does well with it.  Per documentation patient has tried Cymbalta in the past but she does not remember it.  She has stopped Trintellex, it was not helping.  Has taken and failed Prozac, Zoloft, Wellbutrin, Lexapro.    PHQ-2/PHQ-9 Depression Screening 2/22/2018   If you checked off any problems, how difficult have these problems made it for you to do your work, take care of things at home, or get along with other people? Very difficult     During today's visit, I reviewed the documented allergies, medications, chief complaint, and pertinent vitals.  I have confirmed with the patient that there have been no changes since this information was discussed with my clinical team member.    The following portions of the patient's history were reviewed and updated as appropriate: allergies, current medications, past family history, past medical history, past social history, past surgical history and problem list.    Review of Systems   Constitutional: Positive for activity change and fatigue.   Psychiatric/Behavioral: Positive  for depressed mood and stress.       There were no vitals taken for this visit.      Objective   Nursing note reviewed.  General: healthy appearing, no distress.  HENT: no facial deformities, hearing is within normal limits   Neck: phonation normal. No stridor  Pulm: Normal work of breathing  Neuro: A&O x 3. No abnormal movements.  Skin: No rashes appreciated to face. No visible cyanosis  Psych: Mood and affect appropriate. Insight normal. Judgement appropriate. Behavior normal. Memory normal.       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  Continue therapy. Pt wishes to try Cymbalta again, does not recall taking in past.  Psychological condition  will be reassessed in 2 weeks.         Relevant Medications    DULoxetine (Cymbalta) 20 MG capsule      Other Visit Diagnoses     History of adverse drug reaction        History of Manan George Syndrome; watch for any side effects of Cymbalta.          · Patient has been scheduled for follow-up video visit in 2 weeks.    Pia Moe MD

## 2020-06-25 ENCOUNTER — TELEPHONE (OUTPATIENT)
Dept: INTERNAL MEDICINE | Facility: CLINIC | Age: 25
End: 2020-06-25

## 2020-08-07 ENCOUNTER — TELEMEDICINE (OUTPATIENT)
Dept: INTERNAL MEDICINE | Facility: CLINIC | Age: 25
End: 2020-08-07

## 2020-08-07 DIAGNOSIS — E55.9 VITAMIN D DEFICIENCY: ICD-10-CM

## 2020-08-07 DIAGNOSIS — F33.2 SEVERE EPISODE OF RECURRENT MAJOR DEPRESSIVE DISORDER, WITHOUT PSYCHOTIC FEATURES (HCC): ICD-10-CM

## 2020-08-07 DIAGNOSIS — F39 MOOD DISORDER (HCC): Primary | ICD-10-CM

## 2020-08-07 DIAGNOSIS — F41.9 ANXIETY: ICD-10-CM

## 2020-08-07 DIAGNOSIS — Z88.9 HISTORY OF ADVERSE DRUG REACTION: ICD-10-CM

## 2020-08-07 PROCEDURE — 99214 OFFICE O/P EST MOD 30 MIN: CPT | Performed by: FAMILY MEDICINE

## 2020-08-07 RX ORDER — ARIPIPRAZOLE 5 MG/1
5 TABLET ORAL
Qty: 30 TABLET | Refills: 0 | Status: SHIPPED | OUTPATIENT
Start: 2020-08-07 | End: 2020-11-18

## 2020-08-07 RX ORDER — CHOLECALCIFEROL (VITAMIN D3) 1250 MCG
50000 CAPSULE ORAL
Qty: 12 CAPSULE | Refills: 3 | Status: SHIPPED | OUTPATIENT
Start: 2020-08-07 | End: 2020-11-19 | Stop reason: SDUPTHER

## 2020-08-07 NOTE — PROGRESS NOTES
Subjective    Shikha Nunez is a 24 y.o. female here for:  Chief Complaint   Patient presents with   • Depression       Patient presents for a virtual visit. This visit was scheduled as a video visit to comply with patient safety concerns in accordance with CDC recommendations.     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  Mom says this medicine worked the best out of all she's tried before the reaction occurred.  Patient is Terrified of meds due to SJS reaction  Vistaril just makes her sleepy, avoids taking  Cymbalta made her dizzy, nausea  Has also failed Zoloft, Lexapro, Celexa, Prozac, Wellbutrin.  Strong family history of depression, mood disorder  Patient has seen an outside provider who diagnosed bipolar but mom doesn't recall if I or II.   Abilify was sent to [facility] pharmacy 8/3 per chart review but pt has not heard anything regarding medicine being ready  Was encouraged by outside provier to try a medicine and was told it would not cause rash, mom reviewed med and SJS was a possible risk, trust in outside provider is not high.  Mood worse at this time for pt due to recent relationship issues.  Mom supportive.         During today's visit, I reviewed the documented allergies, medications, chief complaint, and pertinent vitals.  I have confirmed with the patient that there have been no changes since this information was discussed with my clinical team member.    The following portions of the patient's history were reviewed and updated as appropriate: allergies, current medications, past family history, past medical history, past social history, past surgical history and problem list.    Review of Systems   Constitutional: Positive for activity change and fatigue.   Psychiatric/Behavioral: Positive for dysphoric mood, sleep disturbance, depressed mood and stress. The patient is nervous/anxious.        There were no vitals taken for this visit.      Objective    Nursing note reviewed.  General: healthy appearing, no distress.  HENT: no facial deformities, hearing is within normal limits   Eyes: EOM intact, no obvious nystagmus.  Neck: phonation normal. No stridor  Pulm: Normal work of breathing  Neuro: A&O x 3. No abnormal movements.  Skin: No rashes appreciated to face. No visible cyanosis  Psych: Mood and affect appropriate. Insight normal. Judgement appropriate. Behavior normal. Memory normal.       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 I've asked her to obtain this report to help guide the ship on medicine choices.  · Educated patient/mom that she may truly have bipolar disorder but diagnosis needs to come from psych provider. Educated that bipolar disorder may be worsened by SSRI therapy, which may explain why she failed many meds previously  · Discussed Abilify, encouraged pt to try. Resent to pharmacy close to parents home where she is at this time.    Pia Moe MD

## 2020-08-08 NOTE — PATIENT INSTRUCTIONS
Bipolar 1 Disorder  Bipolar 1 disorder is a mental health disorder in which a person has episodes of emotional highs (tony), and may also have episodes of emotional lows (depression) in addition to highs. Bipolar 1 disorder is different from other bipolar disorders because it involves extreme manic episodes. These episodes last at least one week or involve symptoms that are so severe that hospitalization is needed to keep the person safe.  What increases the risk?  The cause of this condition is not known. However, certain factors make you more likely to have bipolar disorder, such as:  · Having a family member with the disorder.  · An imbalance of certain chemicals in the brain (neurotransmitters).  · Stress, such as illness, financial problems, or a death.  · Certain conditions that affect the brain or spinal cord (neurologic conditions).  · Brain injury (trauma).  · Having another mental health disorder, such as:  ? Obsessive compulsive disorder.  ? Schizophrenia.  What are the signs or symptoms?  Symptoms of tony include:  · Very high self-esteem or self-confidence.  · Decreased need for sleep.  · Unusual talkativeness or feeling a need to keep talking. Speech may be very fast. It may seem like you cannot stop talking.  · Racing thoughts or constant talking, with quick shifts between topics that may or may not be related (flight of ideas).  · Decreased ability to focus or concentrate.  · Increased purposeful activity, such as work, studies, or social activity.  · Increased nonproductive activity. This could be pacing, squirming and fidgeting, or finger and toe tapping.  · Impulsive behavior and poor judgment. This may result in high-risk activities, such as having unprotected sex or spending a lot of money.  Symptoms of depression include:  · Feeling sad, hopeless, or helpless.  · Frequent or uncontrollable crying.  · Lack of feeling or caring about anything.  · Sleeping too much.  · Moving more slowly than  usual.  · Not being able to enjoy things you used to enjoy.  · Wanting to be alone all the time.  · Feeling guilty or worthless.  · Lack of energy or motivation.  · Trouble concentrating or remembering.  · Trouble making decisions.  · Increased appetite.  · Thoughts of death, or the desire to harm yourself.  Sometimes, you may have a mixed mood. This means having symptoms of depression and tony. Stress can make symptoms worse.  How is this diagnosed?  To diagnose bipolar disorder, your health care provider may ask about your:  · Emotional episodes.  · Medical history.  · Alcohol and drug use. This includes prescription medicines. Certain medical conditions and substances can cause symptoms that seem like bipolar disorder (secondary bipolar disorder).  How is this treated?  Bipolar disorder is a long-term (chronic) illness. It is best controlled with ongoing (continuous) treatment rather than treatment only when symptoms occur. Treatment may include:  · Medicine. Medicine can be prescribed by a provider who specializes in treating mental disorders (psychiatrist).  ? Medicines called mood stabilizers are usually prescribed.  ? If symptoms occur even while taking a mood stabilizer, other medicines may be added.  · Psychotherapy. Some forms of talk therapy, such as cognitive-behavioral therapy (CBT), can provide support, education, and guidance.  · Coping methods, such as journaling or relaxation exercises. These may include:  ? Yoga.  ? Meditation.  ? Deep breathing.  · Lifestyle changes, such as:  ? Limiting alcohol and drug use.  ? Exercising regularly.  ? Getting plenty of sleep.  ? Making healthy eating choices.    A combination of medicine, talk therapy, and coping methods is best. A procedure in which electricity is applied to the brain through the scalp (electroconvulsive therapy) may be used in cases of severe tony when medicine and psychotherapy work too slowly or do not work.  Follow these instructions at  home:  Activity    · Return to your normal activities as told by your health care provider.  · Find activities that you enjoy, and make time to do them.  · Exercise regularly as told by your health care provider.  Lifestyle  · Limit alcohol intake to no more than 1 drink a day for nonpregnant women and 2 drinks a day for men. One drink equals 12 oz of beer, 5 oz of wine, or 1½ oz of hard liquor.  · Follow a set schedule for eating and sleeping.  · Eat a balanced diet that includes fresh fruits and vegetables, whole grains, low-fat dairy, and lean meat.  · Get 7-8 hours of sleep each night.  General instructions  · Take over-the-counter and prescription medicines only as told by your health care provider.  · Think about joining a support group. Your health care provider may be able to recommend a support group.  · Talk with your family and loved ones about your treatment goals and how they can help.  · Keep all follow-up visits as told by your health care provider. This is important.  Where to find more information  For more information about bipolar disorder, visit the following websites:  · National [place] on Mental Illness: www.mary.org  · [place] National [place] of Mental Health: www.nimh.nih.gov  Contact a health care provider if:  · Your symptoms get worse.  · You have side effects from your medicine, and they get worse.  · You have trouble sleeping.  · You have trouble doing daily activities.  · You feel unsafe in your surroundings.  · You are dealing with substance abuse.  Get help right away if:  · You have new symptoms.  · You have thoughts about harming yourself.  · You self-harm.  This information is not intended to replace advice given to you by your health care provider. Make sure you discuss any questions you have with your health care provider.  Document Released: 03/26/2002 Document Revised: 11/30/2018 Document Reviewed: 08/17/2017  Elsevier Patient Education © 2020 Elsevier Inc.      Bipolar 2  Disorder  Bipolar 2 disorder is a mental health disorder in which a person has episodes of emotional highs (tony) and lows (depression). Bipolar 2 is different from other bipolar disorders because the manic episodes are not as high and do not last as long. This is called hypomania. People with bipolar 2 disorder usually go back and forth between hypomanic and depressive episodes.  What are the causes?  The cause of this condition is not known.  What increases the risk?  The following factors may make you more likely to develop this condition:  · Having a family member with the disorder.  · An imbalance of certain chemicals in the brain (neurotransmitters).  · Stress, such as a death, illness, or financial problems.  · Certain conditions that affect the brain or spinal cord (neurologic conditions).  · Brain injury (trauma).  · Having another mental health disorder, such as:  ? Obsessive compulsive disorder.  ? Schizophrenia.  What are the signs or symptoms?  Symptoms of hypomania include:  · Very high self-esteem or self-confidence.  · Decreased need for sleep.  · Unusual talkativeness or feeling a need to keep talking. Speech may be very fast. It may seem like you cannot stop talking.  · Racing thoughts or constant talking, with quick shifts between topics that may or may not be related (flight of ideas).  · Decreased ability to focus or concentrate.  · Increased purposeful activity, such as work, studies, or social activity.  · Increased nonproductive activity. This could be pacing, squirming and fidgeting, or finger and toe tapping.  · Impulsive behavior and poor judgment. This may result in high-risk activities, such as having unprotected sex or spending a lot of money.  Symptoms of depression include:  · Feeling sad, hopeless, or helpless.  · Frequent or uncontrollable crying.  · Lack of feeling or caring about anything.  · Sleeping too much.  · Moving more slowly than usual.  · Not being able to enjoy things  you used to enjoy.  · Desire to be alone all the time.  · Feeling guilty or worthless.  · Lack of energy or motivation.  · Trouble concentrating or remembering.  · Trouble making decisions.  · Increased appetite.  · Thoughts of death or desire to harm yourself.  How is this diagnosed?  To diagnose bipolar 2 disorder, your health care provider may ask about your:  · Emotional episodes.  · Medical history.  · Alcohol and drug use. This includes prescription medicines. Certain medical conditions and substances can cause symptoms that seem like bipolar disorder (secondary bipolar disorder).  How is this treated?  Bipolar 2 disorder is a long-term (chronic) illness. It is best controlled with ongoing (continuous) treatment rather than being treated only when symptoms occur. Treatment may include:  · Psychotherapy. Some forms of talk therapy, such as cognitive-behavioral therapy (CBT), can provide support, education, and guidance.  · Coping strategies, such as journaling or relaxation exercises. Relaxation exercises include:  ? Yoga.  ? Meditation.  ? Deep breathing.  · Lifestyle changes, such as:  ? Limiting alcohol and drug use.  ? Exercising regularly.  ? Getting plenty of sleep.  ? Making healthy eating choices.  · Medicine. Medicine can be prescribed by a health care provider who specializes in treating mental disorders (psychiatrist).  ? Medicines called mood stabilizers are usually prescribed.  ? If symptoms occur even while taking a mood stabilizer, other medicines may be added.  A combination of medicine, talk therapy, and coping methods is the best way to treat this condition.  Follow these instructions at home:  Activity  · Return to your normal activities as told by your health care provider.  · Find activities that you enjoy, and make time to do them.  · Exercise regularly as told by your health care provider.  Lifestyle  · Limit alcohol intake to no more than 1 drink a day for nonpregnant women and 2 drinks  a day for men. One drink equals 12 oz of beer, 5 oz of wine, or 1½ oz of hard liquor.  · Follow a set schedule for eating and sleeping.  · Eat a balanced diet that includes fresh fruits and vegetables, whole grains, low-fat dairy, and lean meats.  · Get at least 7-8 hours of sleep each night.  General instructions  · Take over-the-counter and prescription medicines only as told by your health care provider.  · Think about joining a support group. Your health care provider may be able to recommend a support group.  · Talk with your family and loved ones about your treatment goals and how they can help.  · Keep all follow-up visits as told by your health care provider. This is important.  Where to find more information  For more information about bipolar 2 disorder, visit the following websites:  · National [place] on Mental Illness: www.mary.org  · U.S[place] National [place] of Mental Health: www.nimh.nih.gov  Contact a health care provider if:  · Your symptoms get worse.  · You have side effects from your medicine, and they get worse.  · You have trouble sleeping.  · You have trouble doing daily activities.  · You feel unsafe in your surroundings.  · You are dealing with substance abuse.  Get help right away if:  · You have new symptoms.  · You have thoughts about harming yourself or others.  · You harm yourself.  Summary  · Bipolar 2 disorder is a mental health disorder in which a person has episodes of hypomania and depression.  · Bipolar 2 is best treated through a combination of medicines, talk therapy, and coping strategies.  · Talk with your family and loved ones about your treatment goals and how they can help.  This information is not intended to replace advice given to you by your health care provider. Make sure you discuss any questions you have with your health care provider.  Document Released: 01/23/2018 Document Revised: 11/30/2018 Document Reviewed: 01/23/2018  Elsevier Patient Education © 2020 Elsevier  Inc.

## 2020-09-17 ENCOUNTER — TELEPHONE (OUTPATIENT)
Dept: BEHAVIORAL HEALTH | Facility: CLINIC | Age: 25
End: 2020-09-17

## 2020-09-17 NOTE — TELEPHONE ENCOUNTER
Left voice mail requesting a call back to discuss the appointment today. Is she going to be able to make it in or do we need to reschedule.

## 2020-11-18 ENCOUNTER — OFFICE VISIT (OUTPATIENT)
Dept: INTERNAL MEDICINE | Facility: CLINIC | Age: 25
End: 2020-11-18

## 2020-11-18 ENCOUNTER — RESULTS ENCOUNTER (OUTPATIENT)
Dept: INTERNAL MEDICINE | Facility: CLINIC | Age: 25
End: 2020-11-18

## 2020-11-18 VITALS
HEIGHT: 63 IN | RESPIRATION RATE: 18 BRPM | WEIGHT: 142.38 LBS | DIASTOLIC BLOOD PRESSURE: 63 MMHG | BODY MASS INDEX: 25.23 KG/M2 | HEART RATE: 71 BPM | OXYGEN SATURATION: 100 % | TEMPERATURE: 97.9 F | SYSTOLIC BLOOD PRESSURE: 160 MMHG

## 2020-11-18 DIAGNOSIS — E55.9 VITAMIN D DEFICIENCY: ICD-10-CM

## 2020-11-18 DIAGNOSIS — Z11.3 SCREEN FOR STD (SEXUALLY TRANSMITTED DISEASE): ICD-10-CM

## 2020-11-18 DIAGNOSIS — Z11.3 SCREEN FOR STD (SEXUALLY TRANSMITTED DISEASE): Primary | ICD-10-CM

## 2020-11-18 DIAGNOSIS — R21 RASH: ICD-10-CM

## 2020-11-18 PROCEDURE — 99213 OFFICE O/P EST LOW 20 MIN: CPT | Performed by: FAMILY MEDICINE

## 2020-11-18 NOTE — PROGRESS NOTES
"Subjective    Shikha Nunez is a 24 y.o. female here for:  Chief Complaint   Patient presents with   • Rash     Last week she had a rash come up all over her body, including in her vaginal area. She would like to be checked for STDs today. Denies vaginal itching, burning, or discharge.    Answers for HPI/ROS submitted by the patient on 11/18/2020   Rash  What is the primary reason for your visit?: Rash      History per MA reviewed.    Had a rash but may have been due to different lotion from [employer]  Rash has improved, she is not alarmed by it  Would like an STD check today though    Rash  This is a new problem. The current episode started in the past 7 days. The problem has been gradually improving since onset. The rash is diffuse. The rash is characterized by redness and itchiness. It is unknown if there was an exposure to a precipitant. Pertinent negatives include no anorexia, congestion, cough, diarrhea, eye pain, facial edema, fatigue, fever, joint pain, nail changes, rhinorrhea, shortness of breath, sore throat or vomiting.          The following portions of the patient's history were reviewed and updated as appropriate: allergies, current medications, past family history, past medical history, past social history, past surgical history and problem list.    Review of Systems   Constitutional: Negative for fatigue and fever.   HENT: Negative for congestion, rhinorrhea and sore throat.    Eyes: Negative for pain.   Respiratory: Negative for cough and shortness of breath.    Gastrointestinal: Negative for anorexia, diarrhea and vomiting.   Musculoskeletal: Negative for joint pain.   Skin: Positive for rash. Negative for nail changes.       Visit Vitals  /63   Pulse 71   Temp 97.9 °F (36.6 °C) (Temporal)   Resp 18   Ht 160 cm (62.99\")   Wt 64.6 kg (142 lb 6 oz)   SpO2 100%   BMI 25.23 kg/m²         Objective   Physical Exam  Vitals signs and nursing note reviewed.   Constitutional:       General: She is not " in acute distress.     Appearance: Normal appearance. She is well-developed and well-groomed. She is not ill-appearing, toxic-appearing or diaphoretic.      Interventions: Face mask in place.   HENT:      Head: Normocephalic and atraumatic.      Right Ear: Hearing normal.      Left Ear: Hearing normal.   Eyes:      General: Lids are normal. No scleral icterus.     Extraocular Movements: Extraocular movements intact.      Conjunctiva/sclera: Conjunctivae normal.      Pupils: Pupils are equal, round, and reactive to light.   Neck:      Musculoskeletal: Neck supple.      Trachea: Phonation normal.   Pulmonary:      Effort: Pulmonary effort is normal.   Skin:     General: Skin is warm.      Coloration: Skin is not ashen, cyanotic, jaundiced, pale or sallow.   Neurological:      General: No focal deficit present.      Mental Status: She is alert and oriented to person, place, and time.      Cranial Nerves: No dysarthria.      Motor: Motor function is intact.      Gait: Gait normal.   Psychiatric:         Attention and Perception: Attention and perception normal.         Mood and Affect: Mood and affect normal.         Speech: Speech normal.         Behavior: Behavior normal. Behavior is cooperative.         Thought Content: Thought content normal.         Cognition and Memory: Cognition and memory normal.         Judgment: Judgment normal.           Assessment/Plan     Problem List Items Addressed This Visit     None      Visit Diagnoses     Screen for STD (sexually transmitted disease)    -  Primary    Relevant Orders    STI/STD PANEL SWAB ([facility]) - Swab, Vagina    RPR    HIV-1 / O / 2 Ag / Antibody 4th Generation    Hepatitis Panel, Acute    Vitamin D deficiency        Relevant Orders    Vitamin D 25 Hydroxy    Rash        Relevant Orders    CBC & Differential    Comprehensive Metabolic Panel    TSH+Free T4          · Pt declined to undress for exam per MA. We'll have her do a swab here and down to lab for blood work.      Pia Meo MD

## 2020-11-19 ENCOUNTER — PATIENT MESSAGE (OUTPATIENT)
Dept: INTERNAL MEDICINE | Facility: CLINIC | Age: 25
End: 2020-11-19

## 2020-11-19 DIAGNOSIS — B96.89 BACTERIAL VAGINAL INFECTION: Primary | ICD-10-CM

## 2020-11-19 DIAGNOSIS — N76.0 BACTERIAL VAGINAL INFECTION: Primary | ICD-10-CM

## 2020-11-19 DIAGNOSIS — E55.9 VITAMIN D DEFICIENCY: ICD-10-CM

## 2020-11-19 LAB
25(OH)D3+25(OH)D2 SERPL-MCNC: 17.4 NG/ML (ref 30–100)
ALBUMIN SERPL-MCNC: 4.6 G/DL (ref 3.5–5.2)
ALBUMIN/GLOB SERPL: 2 G/DL
ALP SERPL-CCNC: 83 U/L (ref 39–117)
ALT SERPL-CCNC: 10 U/L (ref 1–33)
AST SERPL-CCNC: 12 U/L (ref 1–32)
BASOPHILS # BLD AUTO: 0.09 10*3/MM3 (ref 0–0.2)
BASOPHILS NFR BLD AUTO: 1.6 % (ref 0–1.5)
BILIRUB SERPL-MCNC: 0.8 MG/DL (ref 0–1.2)
BUN SERPL-MCNC: 8 MG/DL (ref 6–20)
BUN/CREAT SERPL: 10.4 (ref 7–25)
CALCIUM SERPL-MCNC: 9.8 MG/DL (ref 8.6–10.5)
CHLORIDE SERPL-SCNC: 100 MMOL/L (ref 98–107)
CO2 SERPL-SCNC: 29.1 MMOL/L (ref 22–29)
CREAT SERPL-MCNC: 0.77 MG/DL (ref 0.57–1)
EOSINOPHIL # BLD AUTO: 0.23 10*3/MM3 (ref 0–0.4)
EOSINOPHIL NFR BLD AUTO: 4.1 % (ref 0.3–6.2)
ERYTHROCYTE [DISTWIDTH] IN BLOOD BY AUTOMATED COUNT: 12.4 % (ref 12.3–15.4)
GLOBULIN SER CALC-MCNC: 2.3 GM/DL
GLUCOSE SERPL-MCNC: 82 MG/DL (ref 65–99)
HAV IGM SERPL QL IA: NEGATIVE
HBV CORE IGM SERPL QL IA: NEGATIVE
HBV SURFACE AG SERPL QL IA: NEGATIVE
HCT VFR BLD AUTO: 42.8 % (ref 34–46.6)
HCV AB S/CO SERPL IA: <0.1 S/CO RATIO (ref 0–0.9)
HGB BLD-MCNC: 14.7 G/DL (ref 12–15.9)
HIV 1+2 AB+HIV1 P24 AG SERPL QL IA: NON REACTIVE
IMM GRANULOCYTES # BLD AUTO: 0.03 10*3/MM3 (ref 0–0.05)
IMM GRANULOCYTES NFR BLD AUTO: 0.5 % (ref 0–0.5)
LYMPHOCYTES # BLD AUTO: 1.39 10*3/MM3 (ref 0.7–3.1)
LYMPHOCYTES NFR BLD AUTO: 25 % (ref 19.6–45.3)
MCH RBC QN AUTO: 29.1 PG (ref 26.6–33)
MCHC RBC AUTO-ENTMCNC: 34.3 G/DL (ref 31.5–35.7)
MCV RBC AUTO: 84.6 FL (ref 79–97)
MONOCYTES # BLD AUTO: 0.42 10*3/MM3 (ref 0.1–0.9)
MONOCYTES NFR BLD AUTO: 7.6 % (ref 5–12)
NEUTROPHILS # BLD AUTO: 3.4 10*3/MM3 (ref 1.7–7)
NEUTROPHILS NFR BLD AUTO: 61.2 % (ref 42.7–76)
NRBC BLD AUTO-RTO: 0 /100 WBC (ref 0–0.2)
PLATELET # BLD AUTO: 265 10*3/MM3 (ref 140–450)
POTASSIUM SERPL-SCNC: 4.1 MMOL/L (ref 3.5–5.2)
PROT SERPL-MCNC: 6.9 G/DL (ref 6–8.5)
RBC # BLD AUTO: 5.06 10*6/MM3 (ref 3.77–5.28)
RPR SER QL: NON REACTIVE
SODIUM SERPL-SCNC: 138 MMOL/L (ref 136–145)
T4 FREE SERPL-MCNC: 1.13 NG/DL (ref 0.93–1.7)
TSH SERPL DL<=0.005 MIU/L-ACNC: 1.24 UIU/ML (ref 0.27–4.2)
WBC # BLD AUTO: 5.56 10*3/MM3 (ref 3.4–10.8)

## 2020-11-19 RX ORDER — ERGOCALCIFEROL 1.25 MG/1
50000 CAPSULE ORAL
Qty: 12 CAPSULE | Refills: 3 | Status: SHIPPED | OUTPATIENT
Start: 2020-11-19 | End: 2021-09-30

## 2020-12-02 NOTE — TELEPHONE ENCOUNTER
Any word on this? I can understand patient's nervousness regarding the result. I think [facility] Labs checks for HSV.

## 2020-12-04 RX ORDER — METRONIDAZOLE 500 MG/1
500 TABLET ORAL 2 TIMES DAILY
Qty: 14 TABLET | Refills: 0 | Status: SHIPPED | OUTPATIENT
Start: 2020-12-04 | End: 2020-12-17 | Stop reason: SDUPTHER

## 2020-12-12 ENCOUNTER — PATIENT MESSAGE (OUTPATIENT)
Dept: INTERNAL MEDICINE | Facility: CLINIC | Age: 25
End: 2020-12-12

## 2020-12-12 DIAGNOSIS — N76.0 BACTERIAL VAGINAL INFECTION: ICD-10-CM

## 2020-12-12 DIAGNOSIS — B96.89 BACTERIAL VAGINAL INFECTION: ICD-10-CM

## 2020-12-12 DIAGNOSIS — N89.8 VAGINA ITCHING: Primary | ICD-10-CM

## 2020-12-14 ENCOUNTER — RESULTS ENCOUNTER (OUTPATIENT)
Dept: INTERNAL MEDICINE | Facility: CLINIC | Age: 25
End: 2020-12-14

## 2020-12-14 DIAGNOSIS — N89.8 VAGINA ITCHING: ICD-10-CM

## 2020-12-14 RX ORDER — FLUCONAZOLE 150 MG/1
TABLET ORAL
Qty: 2 TABLET | Refills: 0 | Status: SHIPPED | OUTPATIENT
Start: 2020-12-14 | End: 2020-12-22 | Stop reason: SDUPTHER

## 2020-12-14 NOTE — TELEPHONE ENCOUNTER
From: Shikha Nunez  To: Pia Moe MD  Sent: 12/12/2020 10:00 PM EST  Subject: Non-Urgent Medical Question    I am worried that maybe i have a yeast infection or something now? i just wanna make sure i'm okay! i think i get super scared and worked up over every little thing but i also don't want to miss anything     thank you     kerwin

## 2020-12-16 DIAGNOSIS — N89.8 VAGINA ITCHING: ICD-10-CM

## 2020-12-17 RX ORDER — METRONIDAZOLE 500 MG/1
500 TABLET ORAL 2 TIMES DAILY
Qty: 14 TABLET | Refills: 0 | Status: SHIPPED | OUTPATIENT
Start: 2020-12-17 | End: 2020-12-24

## 2020-12-21 ENCOUNTER — PATIENT MESSAGE (OUTPATIENT)
Dept: INTERNAL MEDICINE | Facility: CLINIC | Age: 25
End: 2020-12-21

## 2020-12-21 DIAGNOSIS — N89.8 VAGINA ITCHING: ICD-10-CM

## 2020-12-22 RX ORDER — FLUCONAZOLE 150 MG/1
TABLET ORAL
Qty: 2 TABLET | Refills: 2 | Status: SHIPPED | OUTPATIENT
Start: 2020-12-22 | End: 2021-01-29 | Stop reason: SDUPTHER

## 2020-12-22 NOTE — TELEPHONE ENCOUNTER
From: Shikha Nunez  To: Pia Moe MD  Sent: 12/21/2020 11:33 PM EST  Subject: Non-Urgent Medical Question    Hey! The medicine for the yeast infection never got sent. They just had refilled the antibiotics for some reason!

## 2021-01-29 DIAGNOSIS — N89.8 VAGINA ITCHING: ICD-10-CM

## 2021-01-29 RX ORDER — FLUCONAZOLE 150 MG/1
TABLET ORAL
Qty: 2 TABLET | Refills: 2 | Status: SHIPPED | OUTPATIENT
Start: 2021-01-29 | End: 2021-09-30

## 2021-02-03 ENCOUNTER — PATIENT MESSAGE (OUTPATIENT)
Dept: INTERNAL MEDICINE | Facility: CLINIC | Age: 26
End: 2021-02-03

## 2021-02-03 DIAGNOSIS — N89.8 VAGINA ITCHING: Primary | ICD-10-CM

## 2021-02-04 NOTE — TELEPHONE ENCOUNTER
From: Shikha Nunez  To: Pia Moe MD  Sent: 2/3/2021 10:40 AM EST  Subject: Test Results Question    Just wanted to let you know Darya and I went to [facility] this past Thurs. but they told her she couldn’t get her lab done there because they don’t do swabs. She had to hurry onto work, but we will get them done beginning of next week when I return if she doesn’t go sooner.   Thanks doc :)   Darya/elijah

## 2021-09-30 ENCOUNTER — HOSPITAL ENCOUNTER (EMERGENCY)
Facility: HOSPITAL | Age: 26
Discharge: HOME | End: 2021-09-30
Attending: EMERGENCY MEDICINE | Admitting: EMERGENCY MEDICINE

## 2021-09-30 VITALS
BODY MASS INDEX: 23.9 KG/M2 | OXYGEN SATURATION: 99 % | TEMPERATURE: 98.6 F | HEART RATE: 66 BPM | SYSTOLIC BLOOD PRESSURE: 112 MMHG | HEIGHT: 64 IN | RESPIRATION RATE: 18 BRPM | WEIGHT: 140 LBS | DIASTOLIC BLOOD PRESSURE: 88 MMHG

## 2021-09-30 DIAGNOSIS — K13.79 ORAL PAIN: ICD-10-CM

## 2021-09-30 DIAGNOSIS — S01.511A COMPLICATED LACERATION OF LIP, INITIAL ENCOUNTER: ICD-10-CM
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
## 2021-09-30 PROCEDURE — 99283 EMERGENCY DEPT VISIT LOW MDM: CPT

## 2021-09-30 RX ORDER — LIDOCAINE HYDROCHLORIDE 10 MG/ML
10 INJECTION, SOLUTION EPIDURAL; INFILTRATION; INTRACAUDAL; PERINEURAL ONCE
Status: COMPLETED | OUTPATIENT
Start: 2021-09-30 | End: 2021-09-30

## 2021-09-30 RX ORDER — CEPHALEXIN 250 MG/1
500 CAPSULE ORAL ONCE
Status: COMPLETED | OUTPATIENT
Start: 2021-09-30 | End: 2021-09-30

## 2021-09-30 RX ORDER — IBUPROFEN 600 MG/1
600 TABLET ORAL ONCE
Status: COMPLETED | OUTPATIENT
Start: 2021-09-30 | End: 2021-09-30

## 2021-09-30 RX ORDER — CEPHALEXIN 500 MG/1
500 CAPSULE ORAL 2 TIMES DAILY
Qty: 14 CAPSULE | Refills: 0 | Status: SHIPPED | OUTPATIENT
Start: 2021-09-30 | End: 2022-12-13

## 2021-09-30 RX ORDER — CEPHALEXIN 500 MG/1
500 CAPSULE ORAL 2 TIMES DAILY
Qty: 14 CAPSULE | Refills: 0 | Status: SHIPPED | OUTPATIENT
Start: 2021-09-30 | End: 2021-09-30 | Stop reason: SDUPTHER

## 2021-09-30 RX ORDER — ACETAMINOPHEN 325 MG/1
650 TABLET ORAL ONCE
Status: COMPLETED | OUTPATIENT
Start: 2021-09-30 | End: 2021-09-30

## 2021-09-30 RX ADMIN — CEPHALEXIN 500 MG: 250 CAPSULE ORAL at 04:10

## 2021-09-30 RX ADMIN — LIDOCAINE HYDROCHLORIDE 10 ML: 10 INJECTION, SOLUTION EPIDURAL; INFILTRATION; INTRACAUDAL; PERINEURAL at 03:39

## 2021-09-30 RX ADMIN — ACETAMINOPHEN 650 MG: 325 TABLET, FILM COATED ORAL at 04:32

## 2021-09-30 RX ADMIN — IBUPROFEN 600 MG: 600 TABLET ORAL at 04:32

## 2022-12-13 ENCOUNTER — OFFICE VISIT (OUTPATIENT)
Dept: INTERNAL MEDICINE | Facility: CLINIC | Age: 27
End: 2022-12-13

## 2022-12-13 VITALS
RESPIRATION RATE: 16 BRPM | OXYGEN SATURATION: 97 % | BODY MASS INDEX: 26.4 KG/M2 | DIASTOLIC BLOOD PRESSURE: 62 MMHG | HEART RATE: 73 BPM | TEMPERATURE: 97.8 F | HEIGHT: 64 IN | WEIGHT: 154.6 LBS | SYSTOLIC BLOOD PRESSURE: 102 MMHG

## 2022-12-13 DIAGNOSIS — R79.9 ABNORMAL BLOOD CHEMISTRY: ICD-10-CM

## 2022-12-13 DIAGNOSIS — E66.3 OVERWEIGHT WITH BODY MASS INDEX (BMI) OF 26 TO 26.9 IN ADULT: ICD-10-CM

## 2022-12-13 DIAGNOSIS — Z11.4 ENCOUNTER FOR SCREENING FOR HIV: ICD-10-CM

## 2022-12-13 DIAGNOSIS — R10.2 PELVIC PAIN: Primary | ICD-10-CM

## 2022-12-13 DIAGNOSIS — Z11.3 SCREEN FOR STD (SEXUALLY TRANSMITTED DISEASE): ICD-10-CM

## 2022-12-13 DIAGNOSIS — E55.9 VITAMIN D DEFICIENCY: ICD-10-CM

## 2022-12-13 DIAGNOSIS — Z23 NEED FOR INFLUENZA VACCINATION: ICD-10-CM

## 2022-12-13 DIAGNOSIS — F33.9 EPISODE OF RECURRENT MAJOR DEPRESSIVE DISORDER, UNSPECIFIED DEPRESSION EPISODE SEVERITY: ICD-10-CM

## 2022-12-13 PROCEDURE — 90686 IIV4 VACC NO PRSV 0.5 ML IM: CPT | Performed by: FAMILY MEDICINE

## 2022-12-13 PROCEDURE — 99213 OFFICE O/P EST LOW 20 MIN: CPT | Performed by: FAMILY MEDICINE

## 2022-12-13 PROCEDURE — 90471 IMMUNIZATION ADMIN: CPT | Performed by: FAMILY MEDICINE

## 2022-12-13 NOTE — PROGRESS NOTES
"Chief Complaint  std screen    Subjective        Shikha Nunez presents to [facility]  History of Present Illness  Pt here with mom today requesting STD screening. Has a hard time describing symptoms. Some discomfort to pelvic area. Maybe sometimes some burning. No dyspareunia. No itching. No discharge. No missed periods. Mom asks if labs will include HSV.    Tearful. Doesn't wish to see behavioral health at this time.       Objective   Vital Signs:  /62 (BP Location: Left arm, Patient Position: Sitting, Cuff Size: Adult)   Pulse 73   Temp 97.8 °F (36.6 °C) (Temporal)   Resp 16   Ht 162.6 cm (64\")   Wt 70.1 kg (154 lb 9.6 oz)   SpO2 97%   BMI 26.54 kg/m²   Estimated body mass index is 26.54 kg/m² as calculated from the following:    Height as of this encounter: 162.6 cm (64\").    Weight as of this encounter: 70.1 kg (154 lb 9.6 oz).    BMI is >= 25 and <30. (Overweight) The following options were offered after discussion;: weight loss educational material (shared in after visit summary)      Physical Exam  Vitals and nursing note reviewed.   Constitutional:       General: She is not in acute distress.     Appearance: Normal appearance. She is well-developed and well-groomed. She is not ill-appearing, toxic-appearing or diaphoretic.      Interventions: Face mask in place.   HENT:      Head: Normocephalic and atraumatic.      Right Ear: Hearing normal.      Left Ear: Hearing normal.   Eyes:      General: Lids are normal. No scleral icterus.     Extraocular Movements: Extraocular movements intact.   Neck:      Trachea: Phonation normal.   Pulmonary:      Effort: Pulmonary effort is normal.   Musculoskeletal:      Cervical back: Neck supple.   Skin:     Coloration: Skin is not jaundiced or pale.   Neurological:      General: No focal deficit present.      Mental Status: She is alert and oriented to person, place, and time.      Motor: Motor function is intact.   Psychiatric:   "       Attention and Perception: Perception normal.         Mood and Affect: Mood is anxious. Affect is tearful.         Speech: Speech is delayed.         Behavior: Behavior is withdrawn. Behavior is cooperative.        Result Review :                Assessment and Plan   Diagnoses and all orders for this visit:    1. Pelvic pain (Primary)  -     NuSwab VG+ & HSV  -     Mycoplasma / Ureaplasma Culture - Swab, Urine, Random Void    2. Screen for STD (sexually transmitted disease)  -     NuSwab VG+ & HSV  -     Mycoplasma / Ureaplasma Culture - Swab, Urine, Random Void  -     HIV-1 / O / 2 Ag / Antibody 4th Generation  -     Hepatitis Panel, Acute  -     HSV 1 & 2 - Specific Antibody, IgG  -     RPR    3. Encounter for screening for HIV  -     HIV-1 / O / 2 Ag / Antibody 4th Generation    4. Episode of recurrent major depressive disorder, unspecified depression episode severity (HCC)  Comments:  declines referral at this time, consider referral to [facility] in the future    5. Abnormal blood chemistry  -     CBC (No Diff)  -     Comprehensive Metabolic Panel  -     Vitamin B12 Deficiency [place]    6. Vitamin D deficiency  -     Vitamin D,25-Hydroxy    7. Need for influenza vaccination  -     FluLaval/Fluzone >6 mos ([id number])    8. Overweight with body mass index (BMI) of 26 to 26.9 in adult  Comments:  info via avs             Follow Up   Return for As Needed.  Patient was given instructions and counseling regarding her condition or for health maintenance advice. Please see specific information pulled into the AVS if appropriate.

## 2022-12-14 LAB
25(OH)D3+25(OH)D2 SERPL-MCNC: 18.6 NG/ML (ref 30–100)
ALBUMIN SERPL-MCNC: 4.9 G/DL (ref 3.5–5.2)
ALBUMIN/GLOB SERPL: 2.3 G/DL
ALP SERPL-CCNC: 96 U/L (ref 39–117)
ALT SERPL-CCNC: 12 U/L (ref 1–33)
AST SERPL-CCNC: 14 U/L (ref 1–32)
BILIRUB SERPL-MCNC: 0.3 MG/DL (ref 0–1.2)
BUN SERPL-MCNC: 13 MG/DL (ref 6–20)
BUN/CREAT SERPL: 16.7 (ref 7–25)
CALCIUM SERPL-MCNC: 9.9 MG/DL (ref 8.6–10.5)
CHLORIDE SERPL-SCNC: 101 MMOL/L (ref 98–107)
CO2 SERPL-SCNC: 27.4 MMOL/L (ref 22–29)
CREAT SERPL-MCNC: 0.78 MG/DL (ref 0.57–1)
EGFRCR SERPLBLD CKD-EPI 2021: 108.3 ML/MIN/1.73
ERYTHROCYTE [DISTWIDTH] IN BLOOD BY AUTOMATED COUNT: 12.4 % (ref 12.3–15.4)
GLOBULIN SER CALC-MCNC: 2.1 GM/DL
GLUCOSE SERPL-MCNC: 80 MG/DL (ref 65–99)
HAV IGM SERPL QL IA: NEGATIVE
HBV CORE IGM SERPL QL IA: NEGATIVE
HBV SURFACE AG SERPL QL IA: NEGATIVE
HCT VFR BLD AUTO: 42.9 % (ref 34–46.6)
HCV AB S/CO SERPL IA: <0.1 S/CO RATIO (ref 0–0.9)
HCV AB SERPL QL IA: NORMAL
HGB BLD-MCNC: 14.3 G/DL (ref 12–15.9)
HIV 1+2 AB+HIV1 P24 AG SERPL QL IA: NON REACTIVE
HSV1 IGG SER IA-ACNC: 22 INDEX (ref 0–0.9)
HSV2 IGG SER IA-ACNC: <0.91 INDEX (ref 0–0.9)
INTERPRETATION: NORMAL
MCH RBC QN AUTO: 29.7 PG (ref 26.6–33)
MCHC RBC AUTO-ENTMCNC: 33.3 G/DL (ref 31.5–35.7)
MCV RBC AUTO: 89 FL (ref 79–97)
PLATELET # BLD AUTO: 300 10*3/MM3 (ref 140–450)
POTASSIUM SERPL-SCNC: 4.2 MMOL/L (ref 3.5–5.2)
PROT SERPL-MCNC: 7 G/DL (ref 6–8.5)
RBC # BLD AUTO: 4.82 10*6/MM3 (ref 3.77–5.28)
RPR SER QL: NON REACTIVE
SODIUM SERPL-SCNC: 139 MMOL/L (ref 136–145)
VIT B12 SERPL-MCNC: 425 PG/ML (ref 232–1245)
WBC # BLD AUTO: 7.67 10*3/MM3 (ref 3.4–10.8)

## 2022-12-16 LAB
A VAGINAE DNA VAG QL NAA+PROBE: ABNORMAL SCORE
BVAB2 DNA VAG QL NAA+PROBE: ABNORMAL SCORE
C ALBICANS DNA VAG QL NAA+PROBE: NEGATIVE
C GLABRATA DNA VAG QL NAA+PROBE: NEGATIVE
C TRACH DNA VAG QL NAA+PROBE: NEGATIVE
HSV1 DNA SPEC QL NAA+PROBE: NEGATIVE
HSV2 DNA SPEC QL NAA+PROBE: NEGATIVE
MEGA1 DNA VAG QL NAA+PROBE: ABNORMAL SCORE
N GONORRHOEA DNA VAG QL NAA+PROBE: NEGATIVE
T VAGINALIS DNA VAG QL NAA+PROBE: NEGATIVE

## 2022-12-19 DIAGNOSIS — E55.9 VITAMIN D DEFICIENCY: Primary | ICD-10-CM

## 2022-12-19 DIAGNOSIS — N34.1 NGU DUE TO UREAPLASMA UREALYTICUM: Primary | ICD-10-CM

## 2022-12-19 DIAGNOSIS — A49.3 NGU DUE TO UREAPLASMA UREALYTICUM: Primary | ICD-10-CM

## 2022-12-19 RX ORDER — CHOLECALCIFEROL (VITAMIN D3) 1250 MCG
50000 CAPSULE ORAL
Qty: 12 CAPSULE | Refills: 1 | Status: SHIPPED | OUTPATIENT
Start: 2022-12-19

## 2022-12-19 RX ORDER — DOXYCYCLINE HYCLATE 100 MG/1
100 CAPSULE ORAL 2 TIMES DAILY
Qty: 28 CAPSULE | Refills: 0 | Status: SHIPPED | OUTPATIENT
Start: 2022-12-19 | End: 2023-01-02

## 2022-12-19 NOTE — PROGRESS NOTES
Antibody level for herpes virus type 1 elevated. This indicates you have been exposed to the virus. You may never have an outbreak. If you develop any genital lesions let us know. We may need to see you in office but antivirals such as Valtrex can help.     Vitamin D deficient. Vitamin D is important to keep bones strong. Deficiencies can also lead to aches/pains, fatigue, etc. Sending high dose supplement. Take this with food as on an empty stomach it may cause nausea. When you finish this supplement start taking vitamin D3 2000 IU daily over the counter. Also, try to get 15 min of sun exposure to face and arms daily to build levels naturally.    Swab and mycoplasma testing pending.

## 2022-12-19 NOTE — PROGRESS NOTES
Ureaplasma positive (mycoplasma test pending--therefore you cannot see the ureaplasma result yet). Antibiotic recommended for this is doxycycline 100 mg twice a day x 14 days. Rx sent.     This swab shows one bacteria level elevated but not enough to diagnose bacterial vaginosis. No other antibiotic suggested at this time.    Hsv1 negative on swab--would only be positive if you had a genital lesion at the time of test and if swab came into contact with that lesion.

## 2022-12-20 LAB
M HOMINIS SPEC QL CULT: NEGATIVE
U UREALYTICUM SPEC QL CULT: POSITIVE

## 2022-12-21 NOTE — PROGRESS NOTES
Positive for ureaplasma, which was noted on a different result released previously. Doxycycline was sent previously. Mycoplasma negative.

## 2023-08-29 ENCOUNTER — APPOINTMENT (OUTPATIENT)
Dept: CT IMAGING | Facility: HOSPITAL | Age: 28
End: 2023-08-29
Payer: MEDICAID

## 2023-08-29 ENCOUNTER — HOSPITAL ENCOUNTER (EMERGENCY)
Facility: HOSPITAL | Age: 28
Discharge: HOME | End: 2023-08-29
Attending: STUDENT IN AN ORGANIZED HEALTH CARE EDUCATION/TRAINING PROGRAM
Payer: MEDICAID

## 2023-08-29 VITALS
DIASTOLIC BLOOD PRESSURE: 72 MMHG | WEIGHT: 160 LBS | BODY MASS INDEX: 27.31 KG/M2 | HEIGHT: 64 IN | HEART RATE: 64 BPM | RESPIRATION RATE: 18 BRPM | OXYGEN SATURATION: 100 % | TEMPERATURE: 97.9 F | SYSTOLIC BLOOD PRESSURE: 104 MMHG

## 2023-08-29 DIAGNOSIS — N83.209 RUPTURED OVARIAN CYST: Primary | ICD-10-CM

## 2023-08-29 LAB
ALBUMIN SERPL-MCNC: 4.5 G/DL (ref 3.5–5.2)
ALBUMIN/GLOB SERPL: 2 G/DL
ALP SERPL-CCNC: 77 U/L (ref 39–117)
ALT SERPL W P-5'-P-CCNC: 8 U/L (ref 1–33)
ANION GAP SERPL CALCULATED.3IONS-SCNC: 7.5 MMOL/L (ref 5–15)
AST SERPL-CCNC: 12 U/L (ref 1–32)
B-HCG UR QL: NEGATIVE
BASOPHILS # BLD AUTO: 0.05 10*3/MM3 (ref 0–0.2)
BASOPHILS NFR BLD AUTO: 0.8 % (ref 0–1.5)
BILIRUB SERPL-MCNC: 0.4 MG/DL (ref 0–1.2)
BILIRUB UR QL STRIP: NEGATIVE
BUN SERPL-MCNC: 7 MG/DL (ref 6–20)
BUN/CREAT SERPL: 10.4 (ref 7–25)
CALCIUM SPEC-SCNC: 9.3 MG/DL (ref 8.6–10.5)
CHLORIDE SERPL-SCNC: 104 MMOL/L (ref 98–107)
CLARITY UR: CLEAR
CO2 SERPL-SCNC: 27.5 MMOL/L (ref 22–29)
COLOR UR: YELLOW
CREAT SERPL-MCNC: 0.67 MG/DL (ref 0.57–1)
DEPRECATED RDW RBC AUTO: 40.4 FL (ref 37–54)
EGFRCR SERPLBLD CKD-EPI 2021: 123.8 ML/MIN/1.73
EOSINOPHIL # BLD AUTO: 0.12 10*3/MM3 (ref 0–0.4)
EOSINOPHIL NFR BLD AUTO: 2 % (ref 0.3–6.2)
ERYTHROCYTE [DISTWIDTH] IN BLOOD BY AUTOMATED COUNT: 12.4 % (ref 12.3–15.4)
GLOBULIN UR ELPH-MCNC: 2.3 GM/DL
GLUCOSE SERPL-MCNC: 86 MG/DL (ref 65–99)
GLUCOSE UR STRIP-MCNC: NEGATIVE MG/DL
HCT VFR BLD AUTO: 40.9 % (ref 34–46.6)
HGB BLD-MCNC: 13.9 G/DL (ref 12–15.9)
HGB UR QL STRIP.AUTO: NEGATIVE
HOLD SPECIMEN: NORMAL
HOLD SPECIMEN: NORMAL
IMM GRANULOCYTES # BLD AUTO: 0.02 10*3/MM3 (ref 0–0.05)
IMM GRANULOCYTES NFR BLD AUTO: 0.3 % (ref 0–0.5)
KETONES UR QL STRIP: NEGATIVE
LEUKOCYTE ESTERASE UR QL STRIP.AUTO: NEGATIVE
LIPASE SERPL-CCNC: 25 U/L (ref 13–60)
LYMPHOCYTES # BLD AUTO: 1.42 10*3/MM3 (ref 0.7–3.1)
LYMPHOCYTES NFR BLD AUTO: 24 % (ref 19.6–45.3)
MCH RBC QN AUTO: 30.5 PG (ref 26.6–33)
MCHC RBC AUTO-ENTMCNC: 34 G/DL (ref 31.5–35.7)
MCV RBC AUTO: 89.9 FL (ref 79–97)
MONOCYTES # BLD AUTO: 0.43 10*3/MM3 (ref 0.1–0.9)
MONOCYTES NFR BLD AUTO: 7.3 % (ref 5–12)
NEUTROPHILS NFR BLD AUTO: 3.87 10*3/MM3 (ref 1.7–7)
NEUTROPHILS NFR BLD AUTO: 65.6 % (ref 42.7–76)
NITRITE UR QL STRIP: NEGATIVE
NRBC BLD AUTO-RTO: 0 /100 WBC (ref 0–0.2)
PH UR STRIP.AUTO: 7.5 [PH] (ref 5–8)
PLATELET # BLD AUTO: 231 10*3/MM3 (ref 140–450)
PMV BLD AUTO: 10.9 FL (ref 6–12)
POTASSIUM SERPL-SCNC: 4.3 MMOL/L (ref 3.5–5.2)
PROT SERPL-MCNC: 6.8 G/DL (ref 6–8.5)
PROT UR QL STRIP: NEGATIVE
RBC # BLD AUTO: 4.55 10*6/MM3 (ref 3.77–5.28)
SODIUM SERPL-SCNC: 139 MMOL/L (ref 136–145)
SP GR UR STRIP: 1.02 (ref 1–1.03)
UROBILINOGEN UR QL STRIP: NORMAL
WBC NRBC COR # BLD: 5.91 10*3/MM3 (ref 3.4–10.8)
WHOLE BLOOD HOLD COAG: NORMAL
WHOLE BLOOD HOLD SPECIMEN: NORMAL

## 2023-08-29 PROCEDURE — 99285 EMERGENCY DEPT VISIT HI MDM: CPT

## 2023-08-29 PROCEDURE — 74177 CT ABD & PELVIS W/CONTRAST: CPT

## 2023-08-29 PROCEDURE — 96375 TX/PRO/DX INJ NEW DRUG ADDON: CPT

## 2023-08-29 PROCEDURE — 96361 HYDRATE IV INFUSION ADD-ON: CPT

## 2023-08-29 PROCEDURE — 96374 THER/PROPH/DIAG INJ IV PUSH: CPT

## 2023-08-29 PROCEDURE — 25510000001 IOPAMIDOL 61 % SOLUTION: Performed by: STUDENT IN AN ORGANIZED HEALTH CARE EDUCATION/TRAINING PROGRAM

## 2023-08-29 PROCEDURE — 85025 COMPLETE CBC W/AUTO DIFF WBC: CPT

## 2023-08-29 PROCEDURE — 25010000002 ONDANSETRON PER 1 MG

## 2023-08-29 PROCEDURE — 81025 URINE PREGNANCY TEST: CPT

## 2023-08-29 PROCEDURE — 81003 URINALYSIS AUTO W/O SCOPE: CPT

## 2023-08-29 PROCEDURE — 80053 COMPREHEN METABOLIC PANEL: CPT

## 2023-08-29 PROCEDURE — 25010000002 KETOROLAC TROMETHAMINE PER 15 MG

## 2023-08-29 PROCEDURE — 83690 ASSAY OF LIPASE: CPT

## 2023-08-29 RX ORDER — ONDANSETRON 2 MG/ML
4 INJECTION INTRAMUSCULAR; INTRAVENOUS ONCE
Status: COMPLETED | OUTPATIENT
Start: 2023-08-29 | End: 2023-08-29

## 2023-08-29 RX ORDER — KETOROLAC TROMETHAMINE 30 MG/ML
15 INJECTION, SOLUTION INTRAMUSCULAR; INTRAVENOUS ONCE
Status: COMPLETED | OUTPATIENT
Start: 2023-08-29 | End: 2023-08-29

## 2023-08-29 RX ORDER — SODIUM CHLORIDE 0.9 % (FLUSH) 0.9 %
10 SYRINGE (ML) INJECTION AS NEEDED
Status: DISCONTINUED | OUTPATIENT
Start: 2023-08-29 | End: 2023-08-30 | Stop reason: HOSPADM

## 2023-08-29 RX ORDER — ONDANSETRON 4 MG/1
4 TABLET, ORALLY DISINTEGRATING ORAL EVERY 8 HOURS PRN
Qty: 12 TABLET | Refills: 0 | Status: SHIPPED | OUTPATIENT
Start: 2023-08-29

## 2023-08-29 RX ADMIN — SODIUM CHLORIDE 1000 ML: 9 INJECTION, SOLUTION INTRAVENOUS at 20:10

## 2023-08-29 RX ADMIN — KETOROLAC TROMETHAMINE 15 MG: 30 INJECTION, SOLUTION INTRAMUSCULAR; INTRAVENOUS at 20:10

## 2023-08-29 RX ADMIN — IOPAMIDOL 100 ML: 612 INJECTION, SOLUTION INTRAVENOUS at 20:51

## 2023-08-29 RX ADMIN — ONDANSETRON 4 MG: 2 INJECTION INTRAMUSCULAR; INTRAVENOUS at 20:10

## 2023-08-29 NOTE — Clinical Note
[facility] EMERGENCY DEPARTMENT  [address] [address]  Phone: [phone]    Shikha Nunez was seen and treated in our emergency department on 8/29/2023.  She may return to work on 09/04/2023.         Thank you for choosing [facility].    Ronny Loja PA-C

## 2023-08-30 NOTE — DISCHARGE INSTRUCTIONS
Return to ER for any worsening symptoms.  Recommend Tylenol and Motrin as needed for pain.  If your pain significantly worsens return to the ER for reevaluation.

## 2023-08-30 NOTE — ED PROVIDER NOTES
Subjective  History of Present Illness:    This is a 27-year-old female, history of anxiety, depression, migraine presents emergency room today for evaluation of abdominal pain, back pain, nausea.  No vomiting.  Onset was this morning.  Has been mostly constant with waxing and waning on the lower abdomen patient describes the pain is located across the right middle and lower abdomen.  Sharp in nature at times.  No vaginal bleeding, pelvic pain, vaginal discharge vaginal lesions dysuria hematuria frequency or urgency.  No known sick contacts.  She does not endorse any chest pain or shortness of air.  No known injuries.  She reports this happened just prior to work and wanted to become evaluated.  No medications prior to arrival.  Nothing makes the pain better or worse.  No fevers or chills.  No cough congestion runny nose.      Nurses Notes reviewed and agree, including vitals, allergies, social history and prior medical history.     REVIEW OF SYSTEMS: All systems reviewed and not pertinent unless noted.  Review of Systems   Constitutional:  Negative for chills and fever.   HENT:  Negative for congestion and rhinorrhea.    Respiratory:  Negative for cough and shortness of breath.    Cardiovascular:  Negative for chest pain.   Gastrointestinal:  Positive for abdominal pain and nausea. Negative for constipation, diarrhea and vomiting.   Genitourinary:  Negative for dysuria, flank pain, frequency, genital sores, hematuria, urgency, vaginal bleeding, vaginal discharge and vaginal pain.   All other systems reviewed and are negative.    Past Medical History:   Diagnosis Date    Allergic n/a    Anxiety     Blind left eye     Depression     Migraine     Presence of artificial eye     left,since childhood       Allergies:    Adhesive tape, Lamictal [lamotrigine], Bactrim [sulfamethoxazole-trimethoprim], and Sulfa antibiotics      Past Surgical History:   Procedure Laterality Date    EYE SURGERY Left     multiple, prosthetic  "eye    FACIAL COSMETIC SURGERY  09/1999    HAND SURGERY Right 09/1999         Social History     Socioeconomic History    Marital status: Single   Tobacco Use    Smoking status: Never    Smokeless tobacco: Never   Vaping Use    Vaping Use: Never used   Substance and Sexual Activity    Alcohol use: No    Drug use: No    Sexual activity: Defer         Family History   Problem Relation Age of Onset    Migraines Mother     Arthritis Mother     Arthritis Father     Migraines Father     Arthritis Brother     Migraines Brother        Objective  Physical Exam:  /72   Pulse 64   Temp 97.9 °F (36.6 °C) (Oral)   Resp 18   Ht 162.6 cm (64\")   Wt 72.6 kg (160 lb)   LMP 08/12/2023   SpO2 100%   BMI 27.46 kg/m²      Physical Exam  Vitals and nursing note reviewed.   Constitutional:       General: She is not in acute distress.     Appearance: She is well-developed and normal weight. She is not ill-appearing, toxic-appearing or diaphoretic.   HENT:      Head: Normocephalic and atraumatic.      Mouth/Throat:      Mouth: Mucous membranes are moist.      Pharynx: Oropharynx is clear. No pharyngeal swelling or oropharyngeal exudate.   Eyes:      Extraocular Movements: Extraocular movements intact.   Cardiovascular:      Rate and Rhythm: Normal rate and regular rhythm.      Heart sounds: Normal heart sounds.   Pulmonary:      Effort: Pulmonary effort is normal. No respiratory distress.      Breath sounds: Normal breath sounds. No stridor. No wheezing, rhonchi or rales.   Chest:      Chest wall: No tenderness.   Abdominal:      General: Abdomen is flat. Bowel sounds are normal.      Palpations: Abdomen is soft.      Tenderness: There is abdominal tenderness in the right lower quadrant, suprapubic area and left lower quadrant. There is no right CVA tenderness, left CVA tenderness, guarding or rebound. Positive signs include McBurney's sign. Negative signs include Vance's sign.      Hernia: No hernia is present.   Skin:     " General: Skin is warm and dry.      Capillary Refill: Capillary refill takes less than 2 seconds.   Neurological:      General: No focal deficit present.      Mental Status: She is alert and oriented to person, place, and time.   Psychiatric:         Mood and Affect: Mood normal.         Behavior: Behavior normal.             Procedures    ED Course:         Lab Results (last 24 hours)       Procedure Component Value Units Date/Time    CBC & Differential [[id number]]  (Normal) Collected: 08/29/23 1716    Specimen: Blood Updated: 08/29/23 1725    Narrative:      The following orders were created for panel order CBC & Differential.  Procedure                               Abnormality         Status                     ---------                               -----------         ------                     CBC Auto Differential[[id number]]        Normal              Final result                 Please view results for these tests on the individual orders.    Comprehensive Metabolic Panel [[id number]] Collected: 08/29/23 1716    Specimen: Blood Updated: 08/29/23 1745     Glucose 86 mg/dL      BUN 7 mg/dL      Creatinine 0.67 mg/dL      Sodium 139 mmol/L      Potassium 4.3 mmol/L      Chloride 104 mmol/L      CO2 27.5 mmol/L      Calcium 9.3 mg/dL      Total Protein 6.8 g/dL      Albumin 4.5 g/dL      ALT (SGPT) 8 U/L      AST (SGOT) 12 U/L      Alkaline Phosphatase 77 U/L      Total Bilirubin 0.4 mg/dL      Globulin 2.3 gm/dL      A/G Ratio 2.0 g/dL      BUN/Creatinine Ratio 10.4     Anion Gap 7.5 mmol/L      eGFR 123.8 mL/min/1.73     Narrative:      GFR Normal >60  Chronic Kidney Disease <60  Kidney Failure <15      Lipase [[id number]]  (Normal) Collected: 08/29/23 1716    Specimen: Blood Updated: 08/29/23 1745     Lipase 25 U/L     CBC Auto Differential [[id number]]  (Normal) Collected: 08/29/23 1716    Specimen: Blood Updated: 08/29/23 1725     WBC 5.91 10*3/mm3      RBC 4.55 10*6/mm3      Hemoglobin 13.9 g/dL       Hematocrit 40.9 %      MCV 89.9 fL      MCH 30.5 pg      MCHC 34.0 g/dL      RDW 12.4 %      RDW-SD 40.4 fl      MPV 10.9 fL      Platelets 231 10*3/mm3      Neutrophil % 65.6 %      Lymphocyte % 24.0 %      Monocyte % 7.3 %      Eosinophil % 2.0 %      Basophil % 0.8 %      Immature Grans % 0.3 %      Neutrophils, Absolute 3.87 10*3/mm3      Lymphocytes, Absolute 1.42 10*3/mm3      Monocytes, Absolute 0.43 10*3/mm3      Eosinophils, Absolute 0.12 10*3/mm3      Basophils, Absolute 0.05 10*3/mm3      Immature Grans, Absolute 0.02 10*3/mm3      nRBC 0.0 /100 WBC     Urinalysis With Microscopic If Indicated (No Culture) - Urine, Clean Catch [[id number]]  (Normal) Collected: 08/29/23 1937    Specimen: Urine, Clean Catch Updated: 08/29/23 1949     Color, UA Yellow     Appearance, UA Clear     pH, UA 7.5     Specific Gravity, UA 1.017     Glucose, UA Negative     Ketones, UA Negative     Bilirubin, UA Negative     Blood, UA Negative     Protein, UA Negative     Leuk Esterase, UA Negative     Nitrite, UA Negative     Urobilinogen, UA 0.2 E.U./dL    Narrative:      Urine microscopic not indicated.    Pregnancy, Urine - Urine, Clean Catch [[id number]]  (Normal) Collected: 08/29/23 1937    Specimen: Urine, Clean Catch Updated: 08/29/23 1953     HCG, Urine QL Negative             CT Abdomen Pelvis With Contrast    Result Date: 8/29/2023  FINAL REPORT TECHNIQUE: Oral and IV contrast enhanced exam CLINICAL HISTORY: lower abdominal pain, rule out appendicitis. + mcburneys point FINDINGS: Abdomen: Lung bases are clear. The gallbladder is unremarkable. Liver has an unremarkable CT appearance.  The spleen, pancreas and adrenal glands are unremarkable.  Kidneys show no mass or obstruction.  No bowel obstruction or fluid collection is seen. Pelvis: The appendix is normal.  Pelvic bowel loops are unremarkable.  The small amount of free fluid in the pelvis could be physiologic or related to recently ruptured ovarian cyst.  There is a 3 cm  benign-appearing ovarian cyst of the left ovary.  Uterus is normal.     Impression: 1.  No evidence of appendicitis.   2.  3 cm cyst of the left ovary with prominent free fluid favors recently ruptured cyst. Authenticated and Electronically Signed by PARIS Cadena MD on 08/29/2023 09:33:12 PM        MDM      Initial impression of presenting illness: This is a 27-year-old female presents emergency room today for evaluation of abdominal pain, nausea, back pain.    DDX: includes but is not limited to: Oral GI illness, colitis, gastroenteritis, appendicitis, urinary tract infection, ectopic pregnancy, pancreatitis, gallbladder abnormality, other    Patient arrives dynamically stable, afebrile, nontachycardic nonhypoxic and nontoxic-appearing with vitals interpreted by myself.     Pertinent features from physical exam: McBurney's point and right lower quadrant positive to palpation with tenderness, also suprapubic and minimal left lower quadrant tenderness to palpation, however abdominal exam is minimally reactive, no guarding or rigidity.  Less concern for peritonitis.  Active bowel sounds x4 quadrants, cardiac auscultation revealed regular rate and rhythm, lungs were clear bilaterally.  Neurovascular intact, moves all extremities without difficulty..    Initial diagnostic plan: CBC, CMP, urinalysis, urine hCG, lipase, CT abdomen pelvis with contrast    Results from initial plan were reviewed and interpreted by me revealing urinalysis negative for infection, urine hCG negative, CBC CMP and lipase all unremarkable.  CT abdomen pelvis reveals 3 cm cyst of the left ovary with prominent free fluid which favors recently ruptured cyst.  No evidence of appendicitis.  All labs appear reassuring.    Diagnostic information from other sources: Chart was reviewed.    Interventions / Re-evaluation: Toradol, Zofran, fluids for symptomatic intervention.  Better after interventions.  Stable for discharge.     Results/clinical  rationale were discussed with patient at bedside.  Recommended OB/GYN follow-up and given strict return precautions.    Consultations/Discussion of results with other physicians: Discussed with attending physician.    Disposition plan: Discharge.  Follow-up with OB/GYN and primary care.  Recommended supportive measures.  We will send Zofran for nausea as needed.  She is agreeable to this plan at bedside.  -----    Final diagnoses:   Ruptured ovarian cyst          Ronny Loja PA-C  08/29/23 [id number]

## 2023-09-15 ENCOUNTER — OFFICE VISIT (OUTPATIENT)
Dept: INTERNAL MEDICINE | Facility: CLINIC | Age: 28
End: 2023-09-15
Payer: MEDICAID

## 2023-09-15 VITALS
BODY MASS INDEX: 25.78 KG/M2 | OXYGEN SATURATION: 98 % | DIASTOLIC BLOOD PRESSURE: 68 MMHG | RESPIRATION RATE: 16 BRPM | TEMPERATURE: 97.6 F | SYSTOLIC BLOOD PRESSURE: 112 MMHG | HEIGHT: 64 IN | WEIGHT: 151 LBS | HEART RATE: 61 BPM

## 2023-09-15 DIAGNOSIS — N83.202 CYST OF LEFT OVARY: Primary | ICD-10-CM

## 2023-09-15 NOTE — PROGRESS NOTES
"Chief Complaint  Hospital Follow Up Visit (Ovarian cyst ruptured, still having pain but no bleeding, follows up with gyn next month )    Subjective        Shikha Nunez presents to [facility]  History of Present Illness  ER visit for lower abdominal pain, was found to have likely ruptured cyst on left. Continued discomfort since then but not as bad. Had appointment with ob/gyn but missed it, has it rescheduled. No menstrual irregularities, very regular once a month. Denies vaginal discharge. No urinary nor bowel issues, no constipation.    Objective   Vital Signs:  /68 (BP Location: Right arm, Patient Position: Sitting, Cuff Size: Adult)   Pulse 61   Temp 97.6 °F (36.4 °C) (Temporal)   Resp 16   Ht 162.6 cm (64\")   Wt 68.5 kg (151 lb)   SpO2 98%   BMI 25.92 kg/m²   Estimated body mass index is 25.92 kg/m² as calculated from the following:    Height as of this encounter: 162.6 cm (64\").    Weight as of this encounter: 68.5 kg (151 lb).             Physical Exam  Vitals and nursing note reviewed.   Constitutional:       General: She is not in acute distress.     Appearance: Normal appearance. She is well-developed and well-groomed. She is not ill-appearing, toxic-appearing or diaphoretic.   HENT:      Head: Normocephalic and atraumatic.      Right Ear: Hearing normal.      Left Ear: Hearing normal.   Neck:      Trachea: Phonation normal.   Pulmonary:      Effort: Pulmonary effort is normal.   Abdominal:      General: Bowel sounds are normal. There is no distension.      Palpations: Abdomen is soft.      Tenderness: There is abdominal tenderness (mild lower quadrants). There is no guarding or rebound.   Musculoskeletal:      Cervical back: Neck supple.   Skin:     Coloration: Skin is not jaundiced or pale.   Neurological:      General: No focal deficit present.      Mental Status: She is alert and oriented to person, place, and time.      Motor: Motor function is intact.      " Gait: Gait is intact.   Psychiatric:         Attention and Perception: Attention and perception normal.         Mood and Affect: Mood and affect normal.         Speech: Speech normal.         Behavior: Behavior normal. Behavior is cooperative.         Thought Content: Thought content normal.         Cognition and Memory: Cognition and memory normal.         Judgment: Judgment normal.      Result Review :  The following data was reviewed by: Pia Moe MD on 09/15/2023:  UA          8/29/2023    19:37   Urinalysis   Specific [place], UA 1.017    Ketones, UA Negative    Blood, UA Negative    Leukocytes, UA Negative    Nitrite, UA Negative      CT Abdomen Pelvis With Contrast (08/29/2023 20:51)                Assessment and Plan   Diagnoses and all orders for this visit:    1. Cyst of left ovary (Primary)    See ob/gyn as scheduled, tvus follow up ideal.         Follow Up   Return for As Needed.  Patient was given instructions and counseling regarding her condition or for health maintenance advice. Please see specific information pulled into the AVS if appropriate.

## 2023-10-16 ENCOUNTER — TELEPHONE (OUTPATIENT)
Dept: OBSTETRICS AND GYNECOLOGY | Facility: CLINIC | Age: 28
End: 2023-10-16

## 2023-10-16 NOTE — TELEPHONE ENCOUNTER
Provider: DR. ELMORE    Caller: CHANTELL CHAMBERS    Relationship to Patient: SELF    Pharmacy:     Phone Number: [phone]    Reason for Call: APPOINTMENTS ( RESCHEDULE APPTS FROM 10.17.23 FOR NEW GYN AND US)    When was the patient last seen: NEVER, NEW GYN      PATIENT CALLED IN AND CAN'T MAKE APPOINTMENTS 10.17.23 FOR NEW GYN AND US. [facility] DOES NOT HAVE AVAILABILITY. [facility] WAS 2024 IN MAY AND DID NOT HAVE ANYTHING.    PATIENT CAN BE REACHED [phone]    THANK YOU

## 2023-12-15 ENCOUNTER — OFFICE VISIT (OUTPATIENT)
Dept: OBSTETRICS AND GYNECOLOGY | Facility: CLINIC | Age: 28
End: 2023-12-15
Payer: MEDICAID

## 2023-12-15 VITALS
DIASTOLIC BLOOD PRESSURE: 64 MMHG | BODY MASS INDEX: 25.51 KG/M2 | HEIGHT: 64 IN | WEIGHT: 149.4 LBS | SYSTOLIC BLOOD PRESSURE: 102 MMHG

## 2023-12-15 DIAGNOSIS — N83.291 COMPLEX CYST OF RIGHT OVARY: Primary | ICD-10-CM

## 2023-12-15 PROCEDURE — 1160F RVW MEDS BY RX/DR IN RCRD: CPT | Performed by: PHYSICIAN ASSISTANT

## 2023-12-15 PROCEDURE — 99203 OFFICE O/P NEW LOW 30 MIN: CPT | Performed by: PHYSICIAN ASSISTANT

## 2023-12-15 PROCEDURE — 1159F MED LIST DOCD IN RCRD: CPT | Performed by: PHYSICIAN ASSISTANT

## 2023-12-15 NOTE — PROGRESS NOTES
Subjective   Chief Complaint   Patient presents with    Ovarian Cyst     Follow-up from ER, TVS done for ovarian cyst       Shikha Nunez is a 27 y.o. year old  presenting to be seen for follow-up of ruptured ovarian cyst discovered at an ER visit in late August.  Patient had presented to the ER with some abdominal and pelvic pain.  CT scan done at office visit noted a 3 cm cyst of the left ovary and prominent free fluid consistent with cyst rupture.  Patient reports that she has not had any further episodes of pain as significant as when she was seen in the ER.  She has no complaints today.  Endovaginal ultrasound done in office today is reviewed with patient.  Uterus is anteverted and normal.  Endometrium is 5.8 mm.  Left ovary is normal with small follicles.  Right ovary has a 3.4 cm cyst complex hemorrhagic appearing cyst.  There is a trace of free fluid.  Patient states she has been on multiple birth controls in the past and does not tolerate hormones well.  Has had the Nexplanon, has had an IUD, has used the Depo-Provera injection, has used the vaginal ring, and also OCPs.  She does not want to try hormonal birth control at this time  She is using condoms consistently  Has regular monthly cycles with 5-day bleeds.    Past Medical History:   Diagnosis Date    Allergic n/a    Anxiety     Blind left eye     Depression     Migraine     Ovarian cyst     Presence of artificial eye     left,since childhood        Current Outpatient Medications:     Cholecalciferol (Vitamin D3) 1.25 MG (95235 UT) capsule, Take 1 capsule by mouth Every 7 (Seven) Days., Disp: 12 capsule, Rfl: 1   Allergies   Allergen Reactions    Adhesive Tape Rash    Lamictal [Lamotrigine] Anaphylaxis and Rash    Bactrim [Sulfamethoxazole-Trimethoprim] Hives    Sulfa Antibiotics Rash      Past Surgical History:   Procedure Laterality Date    EYE SURGERY Left     multiple, prosthetic eye    FACIAL COSMETIC SURGERY  1999    HAND  "SURGERY Right 09/1999      Social History     Socioeconomic History    Marital status: Single   Tobacco Use    Smoking status: Never    Smokeless tobacco: Never   Vaping Use    Vaping Use: Never used   Substance and Sexual Activity    Alcohol use: No    Drug use: No    Sexual activity: Yes     Partners: Male     Birth control/protection: Condom      Family History   Problem Relation Age of Onset    Migraines Mother     Arthritis Mother     Arthritis Father     Migraines Father     Arthritis Brother     Migraines Brother        Review of Systems   Constitutional:  Negative for chills, diaphoresis and fever.   Gastrointestinal:  Negative for constipation, diarrhea, nausea and vomiting.   Genitourinary:  Negative for difficulty urinating, dysuria, menstrual problem, pelvic pain and vaginal discharge.           Objective   /64   Ht 162.6 cm (64\")   Wt 67.8 kg (149 lb 6.4 oz)   LMP 12/01/2023   BMI 25.64 kg/m²     Physical Exam  Constitutional:       Appearance: Normal appearance. She is well-developed and well-groomed.   Eyes:      General: Lids are normal.      Extraocular Movements: Extraocular movements intact.      Conjunctiva/sclera: Conjunctivae normal.   Skin:     General: Skin is warm and dry.      Findings: No bruising or lesion.   Neurological:      General: No focal deficit present.      Mental Status: She is alert and oriented to person, place, and time.   Psychiatric:         Attention and Perception: Attention normal.         Mood and Affect: Mood normal.         Speech: Speech normal.         Behavior: Behavior is cooperative.            Result Review :           CT Abdomen Pelvis With Contrast (08/29/2023 20:51)   US Non-ob Transvaginal (12/15/2023 13:30) images reviewed      Assessment and Plan  Diagnoses and all orders for this visit:    1. Complex cyst of right ovary (Primary)  -     US Non-ob Transvaginal; Future      Patient Instructions   Patient declines any hormonal contraception at " this time to suppress ovaries  Follow-up in 8 weeks to repeat TVS to confirm resolution of suspected hemorrhagic right ovarian cyst.             This note was electronically signed.    Mena Alvarez PA-C   December 15, 2023

## 2023-12-15 NOTE — PATIENT INSTRUCTIONS
Patient declines any hormonal contraception at this time to suppress ovaries  Follow-up in 8 weeks to repeat TVS to confirm resolution of suspected hemorrhagic right ovarian cyst.

## 2024-03-07 ENCOUNTER — OFFICE VISIT (OUTPATIENT)
Dept: OBSTETRICS AND GYNECOLOGY | Facility: CLINIC | Age: 29
End: 2024-03-07
Payer: MEDICAID

## 2024-03-07 VITALS
WEIGHT: 157.8 LBS | SYSTOLIC BLOOD PRESSURE: 104 MMHG | BODY MASS INDEX: 26.94 KG/M2 | HEIGHT: 64 IN | DIASTOLIC BLOOD PRESSURE: 64 MMHG

## 2024-03-07 DIAGNOSIS — N83.291 COMPLEX CYST OF RIGHT OVARY: Primary | ICD-10-CM

## 2024-03-07 PROCEDURE — 1160F RVW MEDS BY RX/DR IN RCRD: CPT | Performed by: PHYSICIAN ASSISTANT

## 2024-03-07 PROCEDURE — 1159F MED LIST DOCD IN RCRD: CPT | Performed by: PHYSICIAN ASSISTANT

## 2024-03-07 PROCEDURE — 99213 OFFICE O/P EST LOW 20 MIN: CPT | Performed by: PHYSICIAN ASSISTANT

## 2024-03-07 NOTE — PROGRESS NOTES
Subjective   Chief Complaint   Patient presents with    Follow-up     2 month follow-up TVS       Shikha Nunez is a 28 y.o. year old  presenting to be seen for follow up ovarian cyst.  Patient was seen in December after visit to the ER for abdominal pain.  [facility] had determined that she had probably ruptured ovarian cyst.  Her visit in December pelvic ultrasound was done in office which revealed a 3.5 cm slightly complex hemorrhagic appearing cyst of the right ovary.  Patient has had no problems since her last visit.  Pelvic ultrasound done today is reviewed with patient.  Uterus is anteverted and normal.  Bilateral ovaries with follicles and normal vascular flow.  No free fluid or adnexal masses.  Previously seen suspected hemorrhagic cyst has resolved.  Her LMP was 3/5/2024.  He is not using birth control and does not want birth control.      Past Medical History:   Diagnosis Date    Allergic n/a    Anxiety     Blind left eye     Depression     Migraine     Ovarian cyst     Presence of artificial eye     left,since childhood      No current outpatient medications on file.   Allergies   Allergen Reactions    Adhesive Tape Rash    Lamictal [Lamotrigine] Anaphylaxis and Rash    Bactrim [Sulfamethoxazole-Trimethoprim] Hives    Sulfa Antibiotics Rash      Past Surgical History:   Procedure Laterality Date    EYE SURGERY Left     multiple, prosthetic eye    FACIAL COSMETIC SURGERY  1999    HAND SURGERY Right 1999      Social History     Socioeconomic History    Marital status: Single   Tobacco Use    Smoking status: Never    Smokeless tobacco: Never   Vaping Use    Vaping status: Never Used   Substance and Sexual Activity    Alcohol use: No    Drug use: No    Sexual activity: Yes     Partners: Male     Birth control/protection: Condom      Family History   Problem Relation Age of Onset    Migraines Mother     Arthritis Mother     Arthritis Father     Migraines Father     Arthritis Brother      "Migraines Brother        Review of Systems   Constitutional:  Negative for chills, diaphoresis and fever.   Gastrointestinal:  Negative for constipation, diarrhea, nausea and vomiting.   Genitourinary:  Negative for difficulty urinating, dysuria, menstrual problem and pelvic pain.           Objective   /64   Ht 162.6 cm (64\")   Wt 71.6 kg (157 lb 12.8 oz)   LMP 03/05/2024   BMI 27.09 kg/m²     Physical Exam  Constitutional:       Appearance: Normal appearance. She is well-developed and well-groomed.   Eyes:      General: Lids are normal.      Extraocular Movements: Extraocular movements intact.      Conjunctiva/sclera: Conjunctivae normal.   Neurological:      General: No focal deficit present.      Mental Status: She is alert and oriented to person, place, and time.   Psychiatric:         Attention and Perception: Attention normal.         Mood and Affect: Mood normal.         Speech: Speech normal.         Behavior: Behavior is cooperative.            Result Review :           US Non-ob Transvaginal (03/07/2024 09:47)   US Non-ob Transvaginal (12/15/2023 13:30)       Assessment and Plan  Diagnoses and all orders for this visit:    1. Complex cyst of right ovary (Primary)  -     US Non-ob Transvaginal      Patient Instructions   RTO prn           This note was electronically signed.    Mena Alvarez PA-C   March 7, 2024  "